# Patient Record
Sex: FEMALE | Race: WHITE | Employment: OTHER | URBAN - METROPOLITAN AREA
[De-identification: names, ages, dates, MRNs, and addresses within clinical notes are randomized per-mention and may not be internally consistent; named-entity substitution may affect disease eponyms.]

---

## 2017-05-01 ENCOUNTER — HOSPITAL ENCOUNTER (OUTPATIENT)
Dept: ULTRASOUND IMAGING | Facility: HOSPITAL | Age: 82
Discharge: HOME | End: 2017-05-01
Attending: INTERNAL MEDICINE
Payer: MEDICARE

## 2017-05-01 DIAGNOSIS — I73.9 PVD (PERIPHERAL VASCULAR DISEASE) (HCC): ICD-10-CM

## 2017-05-01 PROCEDURE — 93923 UPR/LXTR ART STDY 3+ LVLS: CPT

## 2017-05-21 ENCOUNTER — HOSPITAL ENCOUNTER (EMERGENCY)
Facility: HOSPITAL | Age: 82
Discharge: HOME | End: 2017-05-21
Attending: EMERGENCY MEDICINE
Payer: MEDICARE

## 2017-05-21 ENCOUNTER — APPOINTMENT (OUTPATIENT)
Dept: GENERAL RADIOLOGY | Facility: HOSPITAL | Age: 82
End: 2017-05-21
Attending: EMERGENCY MEDICINE
Payer: MEDICARE

## 2017-05-21 VITALS
RESPIRATION RATE: 13 BRPM | SYSTOLIC BLOOD PRESSURE: 165 MMHG | OXYGEN SATURATION: 95 % | DIASTOLIC BLOOD PRESSURE: 87 MMHG | TEMPERATURE: 97 F | HEART RATE: 55 BPM | BODY MASS INDEX: 17.63 KG/M2 | WEIGHT: 84 LBS | HEIGHT: 58 IN

## 2017-05-21 DIAGNOSIS — S76.011A MUSCLE STRAIN OF GLUTEAL REGION, RIGHT, INITIAL ENCOUNTER: Primary | ICD-10-CM

## 2017-05-21 PROCEDURE — 99283 EMERGENCY DEPT VISIT LOW MDM: CPT

## 2017-05-21 PROCEDURE — 73502 X-RAY EXAM HIP UNI 2-3 VIEWS: CPT | Performed by: EMERGENCY MEDICINE

## 2017-05-21 PROCEDURE — 96372 THER/PROPH/DIAG INJ SC/IM: CPT

## 2017-05-21 RX ORDER — ATORVASTATIN CALCIUM 40 MG/1
40 TABLET, FILM COATED ORAL NIGHTLY
COMMUNITY

## 2017-05-21 RX ORDER — MORPHINE SULFATE 2 MG/ML
2 INJECTION, SOLUTION INTRAMUSCULAR; INTRAVENOUS ONCE
Status: COMPLETED | OUTPATIENT
Start: 2017-05-21 | End: 2017-05-21

## 2017-05-21 NOTE — ED INITIAL ASSESSMENT (HPI)
Pt fell 6 months ago while in [place], work up was completed at that time. Pt presents to ED today with increasing pain to right hip. Denies any other falls or injury since fall in [place]. Able to move RLE but rates pain 9/10.

## 2017-06-14 ENCOUNTER — PRIOR ORIGINAL RECORDS (OUTPATIENT)
Dept: OTHER | Age: 82
End: 2017-06-14

## 2017-07-12 ENCOUNTER — PRIOR ORIGINAL RECORDS (OUTPATIENT)
Dept: OTHER | Age: 82
End: 2017-07-12

## 2017-07-13 ENCOUNTER — PRIOR ORIGINAL RECORDS (OUTPATIENT)
Dept: OTHER | Age: 82
End: 2017-07-13

## 2017-07-17 ENCOUNTER — PRIOR ORIGINAL RECORDS (OUTPATIENT)
Dept: OTHER | Age: 82
End: 2017-07-17

## 2017-07-22 ENCOUNTER — APPOINTMENT (OUTPATIENT)
Dept: GENERAL RADIOLOGY | Facility: HOSPITAL | Age: 82
DRG: 563 | End: 2017-07-22
Payer: MEDICARE

## 2017-07-22 ENCOUNTER — PRIOR ORIGINAL RECORDS (OUTPATIENT)
Dept: OTHER | Age: 82
End: 2017-07-22

## 2017-07-22 ENCOUNTER — HOSPITAL ENCOUNTER (INPATIENT)
Facility: HOSPITAL | Age: 82
LOS: 4 days | Discharge: NURSING, REHAB OR LONG-TERM CARE | DRG: 563 | End: 2017-07-26
Admitting: INTERNAL MEDICINE
Payer: MEDICARE

## 2017-07-22 ENCOUNTER — APPOINTMENT (OUTPATIENT)
Dept: GENERAL RADIOLOGY | Facility: HOSPITAL | Age: 82
DRG: 563 | End: 2017-07-22
Attending: EMERGENCY MEDICINE
Payer: MEDICARE

## 2017-07-22 DIAGNOSIS — S51.811A SKIN TEAR OF RIGHT FOREARM WITHOUT COMPLICATION, INITIAL ENCOUNTER: ICD-10-CM

## 2017-07-22 DIAGNOSIS — S52.001A CLOSED FRACTURE OF PROXIMAL END OF RIGHT ULNA, UNSPECIFIED FRACTURE MORPHOLOGY, INITIAL ENCOUNTER: Primary | ICD-10-CM

## 2017-07-22 LAB
ANION GAP SERPL CALC-SCNC: 8 MMOL/L (ref 0–18)
BASOPHILS # BLD: 0.1 K/UL (ref 0–0.2)
BASOPHILS NFR BLD: 1 %
BUN SERPL-MCNC: 21 MG/DL (ref 8–20)
BUN/CREAT SERPL: 41.2 (ref 10–20)
CALCIUM SERPL-MCNC: 8.9 MG/DL (ref 8.5–10.5)
CHLORIDE SERPL-SCNC: 109 MMOL/L (ref 95–110)
CO2 SERPL-SCNC: 24 MMOL/L (ref 22–32)
CREAT SERPL-MCNC: 0.51 MG/DL (ref 0.5–1.5)
EOSINOPHIL # BLD: 0.1 K/UL (ref 0–0.7)
EOSINOPHIL NFR BLD: 1 %
ERYTHROCYTE [DISTWIDTH] IN BLOOD BY AUTOMATED COUNT: 14.2 % (ref 11–15)
GLUCOSE SERPL-MCNC: 121 MG/DL (ref 70–99)
HCT VFR BLD AUTO: 34.4 % (ref 35–48)
HGB BLD-MCNC: 11.5 G/DL (ref 12–16)
LYMPHOCYTES # BLD: 1.9 K/UL (ref 1–4)
LYMPHOCYTES NFR BLD: 13 %
MCH RBC QN AUTO: 34.9 PG (ref 27–32)
MCHC RBC AUTO-ENTMCNC: 33.5 G/DL (ref 32–37)
MCV RBC AUTO: 104.1 FL (ref 80–100)
MONOCYTES # BLD: 1.3 K/UL (ref 0–1)
MONOCYTES NFR BLD: 9 %
NEUTROPHILS # BLD AUTO: 11 K/UL (ref 1.8–7.7)
NEUTROPHILS NFR BLD: 76 %
OSMOLALITY UR CALC.SUM OF ELEC: 296 MOSM/KG (ref 275–295)
PLATELET # BLD AUTO: 232 K/UL (ref 140–400)
PMV BLD AUTO: 8.8 FL (ref 7.4–10.3)
POTASSIUM SERPL-SCNC: 3.8 MMOL/L (ref 3.3–5.1)
RBC # BLD AUTO: 3.3 M/UL (ref 3.7–5.4)
SODIUM SERPL-SCNC: 141 MMOL/L (ref 136–144)
WBC # BLD AUTO: 14.5 K/UL (ref 4–11)

## 2017-07-22 PROCEDURE — 73090 X-RAY EXAM OF FOREARM: CPT

## 2017-07-22 PROCEDURE — 85025 COMPLETE CBC W/AUTO DIFF WBC: CPT | Performed by: EMERGENCY MEDICINE

## 2017-07-22 PROCEDURE — 73030 X-RAY EXAM OF SHOULDER: CPT | Performed by: EMERGENCY MEDICINE

## 2017-07-22 PROCEDURE — 99285 EMERGENCY DEPT VISIT HI MDM: CPT

## 2017-07-22 PROCEDURE — 80048 BASIC METABOLIC PNL TOTAL CA: CPT | Performed by: EMERGENCY MEDICINE

## 2017-07-22 PROCEDURE — 36415 COLL VENOUS BLD VENIPUNCTURE: CPT

## 2017-07-22 RX ORDER — RUFINAMIDE 40 MG/ML
1 SUSPENSION ORAL DAILY
Status: DISCONTINUED | OUTPATIENT
Start: 2017-07-23 | End: 2017-07-26

## 2017-07-22 RX ORDER — HYDROCODONE BITARTRATE AND ACETAMINOPHEN 5; 325 MG/1; MG/1
1 TABLET ORAL EVERY 6 HOURS PRN
Status: DISCONTINUED | OUTPATIENT
Start: 2017-07-22 | End: 2017-07-26

## 2017-07-22 RX ORDER — LISINOPRIL 5 MG/1
5 TABLET ORAL DAILY
Status: ON HOLD | COMMUNITY
End: 2018-07-08

## 2017-07-22 RX ORDER — LISINOPRIL 5 MG/1
5 TABLET ORAL DAILY
Status: DISCONTINUED | OUTPATIENT
Start: 2017-07-23 | End: 2017-07-26

## 2017-07-22 RX ORDER — CLOPIDOGREL BISULFATE 75 MG/1
75 TABLET ORAL DAILY
COMMUNITY
End: 2017-07-28

## 2017-07-22 RX ORDER — ATORVASTATIN CALCIUM 40 MG/1
40 TABLET, FILM COATED ORAL NIGHTLY
Status: DISCONTINUED | OUTPATIENT
Start: 2017-07-23 | End: 2017-07-26

## 2017-07-22 RX ORDER — GLUCOSAMINE/CHONDROITIN/C/MANG 500-400 MG
1 CAPSULE ORAL DAILY
Status: DISCONTINUED | OUTPATIENT
Start: 2017-07-23 | End: 2017-07-22 | Stop reason: RX

## 2017-07-23 ENCOUNTER — APPOINTMENT (OUTPATIENT)
Dept: CT IMAGING | Facility: HOSPITAL | Age: 82
DRG: 563 | End: 2017-07-23
Attending: ORTHOPAEDIC SURGERY
Payer: MEDICARE

## 2017-07-23 ENCOUNTER — APPOINTMENT (OUTPATIENT)
Dept: GENERAL RADIOLOGY | Facility: HOSPITAL | Age: 82
DRG: 563 | End: 2017-07-23
Attending: ORTHOPAEDIC SURGERY
Payer: MEDICARE

## 2017-07-23 LAB
APTT PPP: 31.9 SECONDS (ref 23.2–35.3)
BILIRUB UR QL: NEGATIVE
COLOR UR: YELLOW
GLUCOSE UR-MCNC: NEGATIVE MG/DL
HGB UR QL STRIP.AUTO: NEGATIVE
INR BLD: 1.1 (ref 0.9–1.2)
MRSA DNA SPEC QL NAA+PROBE: POSITIVE
NITRITE UR QL STRIP.AUTO: NEGATIVE
PH UR: 5 [PH] (ref 5–8)
PROT UR-MCNC: NEGATIVE MG/DL
PROTHROMBIN TIME: 14 SECONDS (ref 11.8–14.5)
RBC #/AREA URNS AUTO: 5 /HPF
SP GR UR STRIP: 1.02 (ref 1–1.03)
UROBILINOGEN UR STRIP-ACNC: <2
VIT C UR-MCNC: 40 MG/DL
WBC #/AREA URNS AUTO: 2 /HPF

## 2017-07-23 PROCEDURE — 81001 URINALYSIS AUTO W/SCOPE: CPT | Performed by: INTERNAL MEDICINE

## 2017-07-23 PROCEDURE — 73200 CT UPPER EXTREMITY W/O DYE: CPT | Performed by: ORTHOPAEDIC SURGERY

## 2017-07-23 PROCEDURE — 73080 X-RAY EXAM OF ELBOW: CPT | Performed by: ORTHOPAEDIC SURGERY

## 2017-07-23 PROCEDURE — 87641 MR-STAPH DNA AMP PROBE: CPT | Performed by: INTERNAL MEDICINE

## 2017-07-23 PROCEDURE — 76376 3D RENDER W/INTRP POSTPROCES: CPT | Performed by: ORTHOPAEDIC SURGERY

## 2017-07-23 PROCEDURE — 85730 THROMBOPLASTIN TIME PARTIAL: CPT | Performed by: INTERNAL MEDICINE

## 2017-07-23 PROCEDURE — 93005 ELECTROCARDIOGRAM TRACING: CPT

## 2017-07-23 PROCEDURE — 93010 ELECTROCARDIOGRAM REPORT: CPT | Performed by: INTERNAL MEDICINE

## 2017-07-23 PROCEDURE — 85610 PROTHROMBIN TIME: CPT | Performed by: INTERNAL MEDICINE

## 2017-07-23 RX ORDER — HYDROMORPHONE HYDROCHLORIDE 1 MG/ML
INJECTION, SOLUTION INTRAMUSCULAR; INTRAVENOUS; SUBCUTANEOUS
Status: COMPLETED
Start: 2017-07-23 | End: 2017-07-23

## 2017-07-23 RX ORDER — NALOXONE HYDROCHLORIDE 0.4 MG/ML
0.4 INJECTION, SOLUTION INTRAMUSCULAR; INTRAVENOUS; SUBCUTANEOUS ONCE AS NEEDED
Status: DISCONTINUED | OUTPATIENT
Start: 2017-07-23 | End: 2017-07-26

## 2017-07-23 RX ORDER — HYDROMORPHONE HYDROCHLORIDE 1 MG/ML
0.2 INJECTION, SOLUTION INTRAMUSCULAR; INTRAVENOUS; SUBCUTANEOUS ONCE
Status: COMPLETED | OUTPATIENT
Start: 2017-07-23 | End: 2017-07-23

## 2017-07-23 NOTE — ED PROVIDER NOTES
Patient Seen in: [facility] Emergency Department    History   Patient presents with:  Fall (musculoskeletal, neurologic)    Stated Complaint: right arm pain after falling off of step no loc    HPI    Patient is a 43-year-old female who tripped and Chondroitin Maximum Strength 500 mg-400 mg capsule       Family History   Problem Relation Age of Onset   • Cancer Father    • Heart Disease Mother        Smoking status: Never Smoker                                                              Alcohol use Distal circulation sensorimotor functions intact. Lymphadenopathy: No cervical adenopathy. Neurological: Alert and oriented to person, place, and time. Normal reflexes. No cranial nerve deficit.  No motor os sensory defecits noted Coordination normal.

## 2017-07-23 NOTE — PROGRESS NOTES
MULTIVITAMIN LIQD  is Non-Formulary Medication &  Auto-Substituted to Centrum chewable tablet Per P[employer]T PROTOCOL

## 2017-07-23 NOTE — PLAN OF CARE
Impaired Functional Mobility    • Achieve highest/safest level of mobility/gait Not Progressing        Patient anxious/confused at times which causes safety concerns with her mobility, can be impulsive. Sling frequently reapplied to RUE.      PAIN - ADULT

## 2017-07-23 NOTE — PROGRESS NOTES
[facility]    Progress Note    Kodi Billy Patient Status:  Inpatient    3/12/1919 MRN [id number]   Location [facility] 4W/SW/SE Attending Radha Mayer MD   Hosp Day # 1 PCP Chanelle Swan MD       Subjective:   Sachi Nelson Fuc Results:     Recent Labs   Lab  07/22/17 2259   RBC  3.30*   HGB  11.5*   HCT  34.4*   MCV  104.1*   MCH  34.9*   MCHC  33.5   RDW  14.2   WBC  14.5*   PLT  232       Recent Labs   Lab  07/22/17 2259   GLU  121*   BUN  21*   CREATSERUM  0.51   GFRA degenerative changes right shoulder. Degenerative changes acromioclavicular joint with type III acromial morphology which may reflect impingement. 2. Multiple small joint loose bodies. 3. No acute fracture or destructive process.  No dislocation/subluxation

## 2017-07-23 NOTE — ED INITIAL ASSESSMENT (HPI)
Pt reports mechanical fall from standing onto right side, has pain/tenderness to right forearm/elbow. Denies any head injury or LOC. Dressing applied by family to lac on location, bleeding controlleda t this time.

## 2017-07-23 NOTE — PROGRESS NOTES
Patient removed post mold and multiple steri strips. New steri strips applied to laceration, post mold re-applied and re-inforced. Dr. Osman He notified.

## 2017-07-23 NOTE — CONSULTS
[facility]    PATIENT'S NAME: Santino Wynne   ATTENDING PHYSICIAN: Yann Felder MD   CONSULTING PHYSICIAN: Haleigh Nails MD   PATIENT ACCOUNT#:   [id number]    LOCATION:  [address] [place] [address] #:   [id number]       DATE OF B through the dermis with no active bleeding. There was irrigation and debridement and the skin tear was steri-stripped. The patient was placed in a posterior mold splint. She not only takes Plavix on a regular basis, but omega-3 and omega-6 fish oil.   Sh Plavix and omega-3 for history of coronary artery disease with history of a myocardial infarction in the past and unstable angina.     RECOMMENDATIONS:  Review of the x-rays demonstrate not only comminuted fracture, as reported, but a subluxation of the yessy

## 2017-07-23 NOTE — PROGRESS NOTES
Pharmacy Note: Dietary Supplement Discontinuation Per Policy    Glucosamine-Chondroitin CAPS 1 tablethas been discontinued on Bonnie EVELYN Gibson per policy. This supplement may be restarted upon discharge using the medication reconciliation process.     Thank stephy

## 2017-07-23 NOTE — H&P
[facility]    PATIENT'S NAME: Joshua Arianna   ATTENDING PHYSICIAN: Yann Bolivar MD   PATIENT ACCOUNT#:   [id number]    LOCATION:  [address] [place], [place] [address]  #:   [id number]       YOB: 1919  ADMISSION DATE:       07/22/2 tibia-fibula fracture in the 1950s, as well as surgical cataract extraction and left eyelid correction in 2015, I believe.       HABITS:  The patient does not smoke and never has smoked cigarettes or any tobacco.  She rarely drinks alcohol, but has an occas rhonchi. There is no dullness to percussion. HEART:  A regular rate and rhythm with a normal S1 and S2. No S3 or S4 is appreciated. There is a grade 3/6 holosystolic murmur at the right and left upper sternal borders.   ABDOMEN:  Soft with positive acti morphology, which required subluxation of the joint and closed reduction performed by Dr. Carmen Jiménez with re-application of the posterior mold splint.   A CT of the arm will be obtained and surgical fixation of the fracture was discussed with patient by Dr. Rosa Putnam

## 2017-07-24 ENCOUNTER — PRIOR ORIGINAL RECORDS (OUTPATIENT)
Dept: OTHER | Age: 82
End: 2017-07-24

## 2017-07-24 ENCOUNTER — APPOINTMENT (OUTPATIENT)
Dept: CV DIAGNOSTICS | Facility: HOSPITAL | Age: 82
DRG: 563 | End: 2017-07-24
Attending: INTERNAL MEDICINE
Payer: MEDICARE

## 2017-07-24 LAB
ALBUMIN SERPL BCP-MCNC: 4 G/DL (ref 3.5–4.8)
ANION GAP SERPL CALC-SCNC: 11 MMOL/L (ref 0–18)
BASOPHILS # BLD: 0.1 K/UL (ref 0–0.2)
BASOPHILS NFR BLD: 1 %
BUN SERPL-MCNC: 16 MG/DL (ref 8–20)
BUN/CREAT SERPL: 27.6 (ref 10–20)
CALCIUM SERPL-MCNC: 9 MG/DL (ref 8.5–10.5)
CHLORIDE SERPL-SCNC: 107 MMOL/L (ref 95–110)
CO2 SERPL-SCNC: 20 MMOL/L (ref 22–32)
CREAT SERPL-MCNC: 0.58 MG/DL (ref 0.5–1.5)
EOSINOPHIL # BLD: 0 K/UL (ref 0–0.7)
EOSINOPHIL NFR BLD: 0 %
ERYTHROCYTE [DISTWIDTH] IN BLOOD BY AUTOMATED COUNT: 13.8 % (ref 11–15)
GLUCOSE SERPL-MCNC: 132 MG/DL (ref 70–99)
HCT VFR BLD AUTO: 34.2 % (ref 35–48)
HGB BLD-MCNC: 11.5 G/DL (ref 12–16)
LYMPHOCYTES # BLD: 1.7 K/UL (ref 1–4)
LYMPHOCYTES NFR BLD: 12 %
MCH RBC QN AUTO: 35 PG (ref 27–32)
MCHC RBC AUTO-ENTMCNC: 33.8 G/DL (ref 32–37)
MCV RBC AUTO: 103.6 FL (ref 80–100)
MONOCYTES # BLD: 2.6 K/UL (ref 0–1)
MONOCYTES NFR BLD: 17 %
NEUTROPHILS # BLD AUTO: 10.5 K/UL (ref 1.8–7.7)
NEUTROPHILS NFR BLD: 71 %
OSMOLALITY UR CALC.SUM OF ELEC: 289 MOSM/KG (ref 275–295)
PHOSPHATE SERPL-MCNC: 3 MG/DL (ref 2.4–4.7)
PLATELET # BLD AUTO: 277 K/UL (ref 140–400)
PMV BLD AUTO: 8.3 FL (ref 7.4–10.3)
POTASSIUM SERPL-SCNC: 3.8 MMOL/L (ref 3.3–5.1)
RBC # BLD AUTO: 3.3 M/UL (ref 3.7–5.4)
SODIUM SERPL-SCNC: 138 MMOL/L (ref 136–144)
WBC # BLD AUTO: 14.8 K/UL (ref 4–11)

## 2017-07-24 PROCEDURE — 93307 TTE W/O DOPPLER COMPLETE: CPT | Performed by: INTERNAL MEDICINE

## 2017-07-24 PROCEDURE — 80069 RENAL FUNCTION PANEL: CPT | Performed by: INTERNAL MEDICINE

## 2017-07-24 PROCEDURE — 85025 COMPLETE CBC W/AUTO DIFF WBC: CPT | Performed by: INTERNAL MEDICINE

## 2017-07-24 PROCEDURE — 85060 BLOOD SMEAR INTERPRETATION: CPT | Performed by: INTERNAL MEDICINE

## 2017-07-24 RX ORDER — HEPARIN SODIUM 5000 [USP'U]/ML
5000 INJECTION, SOLUTION INTRAVENOUS; SUBCUTANEOUS EVERY 12 HOURS SCHEDULED
Status: DISCONTINUED | OUTPATIENT
Start: 2017-07-24 | End: 2017-07-26

## 2017-07-24 RX ORDER — DIPHENHYDRAMINE HCL 25 MG
25 CAPSULE ORAL NIGHTLY PRN
Status: DISCONTINUED | OUTPATIENT
Start: 2017-07-24 | End: 2017-07-26

## 2017-07-24 RX ORDER — ESCITALOPRAM OXALATE 10 MG/1
5 TABLET ORAL EVERY MORNING
Status: DISCONTINUED | OUTPATIENT
Start: 2017-07-24 | End: 2017-07-26

## 2017-07-24 NOTE — PROGRESS NOTES
[facility]    Progress Note    Umer Royalty Patient Status:  Inpatient    3/12/1919 MRN [id number]   Location [facility] 4W/SW/SE Attending Kristie Arango MD   Hosp Day # 2 PCP Rip Andrade MD     Date of Admission:   tablet 1 tablet Oral Daily       HISTORY:  Past Medical History:  Past Medical History:   Diagnosis Date   • Arthritis    • Back pain    • Bursitis of elbow    • Cancer [facility]    • Fx 1997    per [employer]:  \"FX leslie 1997\"   • Hearing loss     per [employer] tissue swelling. No radiopaque foreign body.  3. Persistent superior/proximal/dorsal displacement of the olecranon fragment which remains rotated         Xr Forearm (2 Views), Right (cpt=73090)    Result Date: 7/23/2017  CONCLUSION:    BONES: Diffuse demine PAC s -Left axis -anterior fascicular block.  -Nonspecific ST depression -Nondiagnostic.  ABNORMAL When compared with ECG of 11/19/2014 10:35:45 Marked T wave changes are no longer seen Electronically signed on 07/23/2017 at 16:26 by TEN Davis

## 2017-07-24 NOTE — HISTORICAL OFFICE NOTE
Michael Petar  : 1919  ACCOUNT:  [id number]  [phone]  PCP: Dr. Julio Campos     TODAY'S DATE: 2017  DICTATED BY:  Arik Decker M.D. ]    CHIEF COMPLAINT: [Followup of .  CAD, established, Followup of Hyperlipidemia, mixed, Followup of Hyp parameters reviewed and discussed. HEAD/FACE: no trauma and normocephalic. EYES: conjunctivae not injected and no xanthelasma. NECK: jugular venous pressure not elevated. RESP: respirations with normal rate and rhythm, clear to auscultation.  GI: no masses, TABLET AT BEDTIME.                     12/08/14 Calcium 500 + D       500-125M  1 tab daily                              12/08/14 Multivitamins                   1 cap daily                              12/08/14 Norco                 5-325MG   as directed

## 2017-07-24 NOTE — DIETARY NOTE
ADULT NUTRITION INITIAL ASSESSMENT    Pt is at moderate nutrition risk. Pt does not meet malnutrition criteria. RECOMMENDATIONS TO MD: RD to order and manage oral nutritional supplement.       NUTRITION DIAGNOSIS/PROBLEM:  Inadequate oral intake relat hrs:   Weight   07/24/17 0422 38.6 kg (85 lb 3.2 oz)   07/22/17 2323 40.1 kg (88 lb 6.4 oz)   07/22/17 2003 40.4 kg (89 lb)   Wt Readings from Last 10 Encounters:  07/24/17 : 38.6 kg (85 lb 3.2 oz)  05/21/17 : 38.1 kg (84 lb)  07/03/14 : 40.8 kg (90 lb)  1 Anthropometric Measurement:      Monitor: wt  - Nutrition Goals:      gradual wt gain as able, PO and supplement greater than 75% of needs and promote healing      DIETITIAN FOLLOW UP: RD to follow up within 7 days    [address]

## 2017-07-24 NOTE — PROGRESS NOTES
[facility] dosing service    Initial Pharmacokinetic Consult for Vancomycin Dosing     Liss Joyner is a 80year old female admitted on 7/22who is being treated for cellulitis.   Pharmacy has been asked to dose Vancomycin by Dr. Prachi Hernandez    She is aller hours  based upon, 41kg weight, renal function, and pharmacokinetics. 2.  Pharmacy ordered Vancomycin trough level(s) prior to 3rd dose. Goal trough level 10-15 ug/mL.     3.  Pharmacy will need BUN/Scr daily while on Vancomycin to assess renal functio

## 2017-07-24 NOTE — DISCHARGE PLANNING
7/24CM-MD orders received in regards to discharge planning. The Patient was resting, this Writer spoke with the Patient's caregiver/[facility] [place] ([phone]) at bedside. The Patient resides alone in [place]  in a ranch home with on step to enter.   Prior to

## 2017-07-24 NOTE — CONSULTS
[facility]    PATIENT'S NAME: Wilton Bearden   ATTENDING PHYSICIAN: Yann Jarvis MD   CONSULTING PHYSICIAN: Yolanda Sabillon.  Radha Lopez MD   PATIENT ACCOUNT#:   [id number]    LOCATION:  [address] [place] [address] #:   [id number]       DATE OF BIRTH: REVIEW OF SYSTEMS:  Otherwise negative. PHYSICAL EXAMINATION:    GENERAL:  Well-developed, well-nourished female in no acute distress. She is alert and oriented x3.     VITAL SIGNS:  Blood pressure 121/60, respirations 20 and unlabored, pulse 100 on her history of 2 proximal stents and the need to come off Plavix. She does appear to be maximally treated and compensated for her known underlying cardiac disease at this time and is asymptomatic.       Follow up on echocardiogram.  Unless the LV functi

## 2017-07-24 NOTE — CONSULTS
Cardiology (Consult dictated)    Assessment:  1. Status post mechanical fall with complex fracture of proximal right ulna. Surgical correction being considered. 2.  Coronary artery disease.   Status post stenting of the proximal LAD and proximal ramus

## 2017-07-24 NOTE — PROGRESS NOTES
[facility]    Progress Note    Marv Finney Patient Status:  Inpatient    3/12/1919 MRN [id number]   Location [facility] 4W/SW/SE Attending Roxanna Hoyt MD   Hosp Day # 2 PCP Angely Watkins MD       Subjective:   Chelsea Marcial Fuc 8.9  9.0   ALB   --   4.0   NA  141  138   K  3.8  3.8   CL  109  107   CO2  24  20*       No results for input(s): LIP, HETAL in the last 72 hours. No results for input(s): TROP, CK in the last 72 hours.     Recent Labs   Lab  07/23/17   0552   INR  1.1 Xr Shoulder, Complete (min 2 Views), Right (cpt=73030)    Result Date: 7/23/2017  CONCLUSION:  1. Chronic degenerative changes right shoulder. Degenerative changes acromioclavicular joint with type III acromial morphology which may reflect impingement.

## 2017-07-24 NOTE — PLAN OF CARE
NEUROLOGICAL - ADULT    • Achieves stable or improved neurological status Not Progressing    • Achieves maximal functionality and self care Not Progressing        PT REMAINS RESTLESS, ALERT BUT DISORIENTED TO TIME AND PLACE, NEEDS FREQUENT REORIENTATION.  P SPECIAL MATRESS.

## 2017-07-25 LAB
PA ADP PRP-ACNC: 313
VANCOMYCIN TROUGH SERPL-MCNC: 4.1 MCG/ML (ref 10–20)

## 2017-07-25 PROCEDURE — 85576 BLOOD PLATELET AGGREGATION: CPT | Performed by: ORTHOPAEDIC SURGERY

## 2017-07-25 PROCEDURE — 80202 ASSAY OF VANCOMYCIN: CPT | Performed by: ORTHOPAEDIC SURGERY

## 2017-07-25 PROCEDURE — 99211 OFF/OP EST MAY X REQ PHY/QHP: CPT

## 2017-07-25 RX ORDER — SODIUM CHLORIDE 9 MG/ML
INJECTION, SOLUTION INTRAVENOUS
Status: COMPLETED
Start: 2017-07-25 | End: 2017-07-25

## 2017-07-25 RX ORDER — DIPHENHYDRAMINE HCL 25 MG
25 CAPSULE ORAL NIGHTLY PRN
Qty: 30 CAPSULE | Refills: 0 | Status: ON HOLD | OUTPATIENT
Start: 2017-07-25 | End: 2018-07-04

## 2017-07-25 RX ORDER — BISACODYL 10 MG
10 SUPPOSITORY, RECTAL RECTAL
Status: DISCONTINUED | OUTPATIENT
Start: 2017-07-25 | End: 2017-07-26

## 2017-07-25 RX ORDER — ESCITALOPRAM OXALATE 5 MG/1
5 TABLET ORAL EVERY MORNING
Qty: 30 TABLET | Refills: 0 | Status: SHIPPED | OUTPATIENT
Start: 2017-07-25 | End: 2019-09-03

## 2017-07-25 RX ORDER — CEFADROXIL 500 MG/1
500 CAPSULE ORAL 2 TIMES DAILY
Qty: 20 CAPSULE | Refills: 0 | Status: SHIPPED | OUTPATIENT
Start: 2017-07-25 | End: 2017-07-30

## 2017-07-25 RX ORDER — ACETAMINOPHEN 325 MG/1
650 TABLET ORAL EVERY 6 HOURS PRN
Status: DISCONTINUED | OUTPATIENT
Start: 2017-07-25 | End: 2017-07-26

## 2017-07-25 RX ORDER — 0.9 % SODIUM CHLORIDE 0.9 %
VIAL (ML) INJECTION
Status: COMPLETED
Start: 2017-07-25 | End: 2017-07-25

## 2017-07-25 NOTE — CONSULTS
Hand Surgery Consultation      [facility]    Report of Consultation    Marc Merino Patient Status:  Inpatient    3/12/1919 MRN [id number]   Location [facility] 4W/SW/SE Attending Tatum Nagel MD   Hosp Day # 3 RAMEZ GUERIN (Porcine) 5000 UNIT/ML injection 5,000 Units 5,000 Units Subcutaneous 2 times per day   diphenhydrAMINE (BENADRYL) cap/tab 25 mg 25 mg Oral Nightly PRN   escitalopram (LEXAPRO) tablet 5 mg 5 mg Oral QAM   Naloxone HCl (NARCAN) 0.4 MG/ML injection 0.4 mg 0. forearm wound consistent with skin tear extending for about 12 cm longitudinally over the dorsal forearm. No signs of infection. Active elbow flexion is to 110 degrees; unable to extend the elbow against resistance. Neurovascularly intact distally.   I a patient.     Isabel Lauro  7/25/2017

## 2017-07-25 NOTE — CM/SW NOTE
Patient is enrolled under  (fracture). [facility] brochure, letter and CTL's phone # [email or web address]. [facility] [phone]

## 2017-07-25 NOTE — WOUND PROGRESS NOTE
Wound Care Services  Received nursing request for specialty bed, the pt is 80years old with a right ulnar fracture, the pt. is asleep in bed with her sling on, family at the bedside.  Spoke with the pt's nurse and the pt. may be discharged today and come b

## 2017-07-25 NOTE — PLAN OF CARE
DISCHARGE PLANNING    • Discharge to home or other facility with appropriate resources Progressing    LASHONDA [facility]      Impaired Functional Mobility    • Achieve highest/safest level of mobility/gait Progressing

## 2017-07-25 NOTE — PLAN OF CARE
DISCHARGE PLANNING    • Discharge to home or other facility with appropriate resources Progressing        Impaired Functional Mobility    • Achieve highest/safest level of mobility/gait Progressing        NEUROLOGICAL - ADULT    • Achieves stable or improv

## 2017-07-25 NOTE — PROGRESS NOTES
[facility]    Progress Note    Dajuan Overall Patient Status:  Inpatient    3/12/1919 MRN [id number]   Location [facility] 4W/SW/SE Attending Sara Mc MD   Hosp Day # 3 PCP Sandee Liao MD     Date of Admission:   HYDROcodone-acetaminophen (NORCO) 5-325 MG per tab 1 tablet 1 tablet Oral Q6H PRN   lisinopril (PRINIVIL,ZESTRIL) tab 5 mg 5 mg Oral Daily   metoprolol Tartrate (LOPRESSOR) tab 25 mg 25 mg Oral BID   Multivitamin Chewtab (ADULT) (CENTRUM) 1 tablet 1 tabl body. 3. Persistent superior/proximal/dorsal displacement of the olecranon fragment which remains rotated         Ct Elbow Right (vcm=77973)    Result Date: 7/23/2017  CONCLUSION:  1.  Comminuted fracture of the proximal ulna with fracture lines extending i transcription software was utilized to produce this note. The note was proofread for content only. Typographical errors may remain.       Jose Del Valle MD  7/25/2017

## 2017-07-25 NOTE — PROGRESS NOTES
[facility]  Progress Note    Betsy Theodore Patient Status:  Inpatient    3/12/1919 MRN [id number]   Location [facility] 4W/SW/SE Attending Jun Dorado MD   Hosp Day # 3 PCP Re Mckinley MD     Assessment:    1.   Status post  rhythm, S1, S2 normal, no murmur  Lungs: Clear without wheezes, rales, rhonchi. Normal excursions and effort. Abdomen: Soft, non-tender. Skin: Warm and dry.      Labs:       Lab Results  Component Value Date   INR 1.1 07/23/2017           No results fou

## 2017-07-25 NOTE — PROGRESS NOTES
[facility]    Progress Note    Dajuan Overall Patient Status:  Inpatient    3/12/1919 MRN [id number]   Location [facility] 4W/SW/SE Attending Sara Mc MD   Hosp Day # 3 PCP Sandee Liao MD       Subjective:   Lyndon Fatima BUN  21*  16   CREATSERUM  0.51  0.58   GFRAA  >60  >60   GFRNAA  >60  >60   CA  8.9  9.0   ALB   --   4.0   NA  141  138   K  3.8  3.8   CL  109  107   CO2  24  20*       No results for input(s): LIP, HETAL in the last 72 hours.     No results for input(s) MD  7/25/2017

## 2017-07-26 VITALS
WEIGHT: 84.69 LBS | OXYGEN SATURATION: 92 % | TEMPERATURE: 99 F | DIASTOLIC BLOOD PRESSURE: 50 MMHG | RESPIRATION RATE: 18 BRPM | SYSTOLIC BLOOD PRESSURE: 112 MMHG | HEIGHT: 59 IN | HEART RATE: 72 BPM | BODY MASS INDEX: 17.07 KG/M2

## 2017-07-26 PROCEDURE — 97760 ORTHOTIC MGMT&TRAING 1ST ENC: CPT

## 2017-07-26 RX ORDER — ACETAMINOPHEN 325 MG/1
650 TABLET ORAL EVERY 6 HOURS PRN
Qty: 1 TABLET | Refills: 0 | Status: SHIPPED | OUTPATIENT
Start: 2017-07-26

## 2017-07-26 NOTE — PROGRESS NOTES
[facility]    Progress Note    Kemarandreas Delgado Patient Status:  Inpatient    3/12/1919 MRN [id number]   Location [facility] 4W/SW/SE Attending Chris Higuera MD   Hosp Day # 4 PCP Shade Monsivais MD       Subjective:   Dean Fatima CA  9.0   ALB  4.0   NA  138   K  3.8   CL  107   CO2  20*       No results for input(s): LIP, HETAL in the last 72 hours. No results for input(s): TROP, CK in the last 72 hours. No results for input(s): PT, INR in the last 72 hours.     No results fo

## 2017-07-26 NOTE — OCCUPATIONAL THERAPY NOTE
OT ADULT SPLINT EVALUATION - INPATIENT    Evaluation Date: 8/2   Type of Evaluation: Initial  Re-Evaluation Reason:   Presenting Problem:  (Closed fracture of proximal end of right ulna) motivated  Rehab Barriers: history of falls    PLAN  Patient to d/c to rehab today    [facility]  #[id number]

## 2017-07-26 NOTE — PROGRESS NOTES
[facility] Progress Note      Kiana Simran Patient Status:  Inpatient    3/12/1919 MRN [id number]   Location [facility] 4W/SW/SE Attending Irina Zhu MD   Hosp Day # 4 PCP Shell Collado MD 1,250 mg Intravenous Q24H   • Heparin Sodium (Porcine)  5,000 Units Subcutaneous 2 times per day   • escitalopram  5 mg Oral QAM   • mupirocin  1 Application Each Nare BID   • atorvastatin  40 mg Oral Nightly   • lisinopril  5 mg Oral Daily   • metoprolol

## 2017-07-26 NOTE — PROGRESS NOTES
[facility]    Progress Note    Laura Gonzalez Patient Status:  Inpatient    3/12/1919 MRN [id number]   Location [facility] 4W/SW/SE Attending Paco Beltre MD   Hosp Day # 4 PCP Corbin Santana MD       Subjective:   Juan Mon Fuc hours. No results for input(s): TROP, CK in the last 72 hours. No results for input(s): PT, INR in the last 72 hours. No results for input(s): PGLU in the last 72 hours.     Scheduled Meds:   • vancomycin  1,250 mg Intravenous Q24H   • Heparin Sodi

## 2017-07-26 NOTE — DISCHARGE PLANNING
The pt. Is scheduled to discharge to [facility] today 7/26 at 330p, via ambulance as she is a max assist.  The pt's POA is aware of the above.     Report [phone]    [facility] [phone]  LAI spoke with the pt's POA regarding the Medicare

## 2017-07-26 NOTE — PLAN OF CARE
DISCHARGE PLANNING    • Discharge to home or other facility with appropriate resources Adequate for Discharge    Discharged to [facility] by ambulance. Agnieszka Lopez POMARKO aware and agreeable.  Pt to [place] couple of weeks\" for surgery when ruddy

## 2017-07-27 ENCOUNTER — SNF VISIT (OUTPATIENT)
Dept: INTERNAL MEDICINE CLINIC | Facility: SKILLED NURSING FACILITY | Age: 82
End: 2017-07-27

## 2017-07-27 VITALS
BODY MASS INDEX: 18 KG/M2 | HEART RATE: 88 BPM | TEMPERATURE: 99 F | SYSTOLIC BLOOD PRESSURE: 126 MMHG | DIASTOLIC BLOOD PRESSURE: 60 MMHG | WEIGHT: 88 LBS | RESPIRATION RATE: 17 BRPM

## 2017-07-27 DIAGNOSIS — N39.0 URINARY TRACT INFECTION, SITE UNSPECIFIED: ICD-10-CM

## 2017-07-27 DIAGNOSIS — S52.001D CLOSED FRACTURE OF PROXIMAL END OF RIGHT ULNA WITH ROUTINE HEALING, UNSPECIFIED FRACTURE MORPHOLOGY, SUBSEQUENT ENCOUNTER: Primary | ICD-10-CM

## 2017-07-27 NOTE — PROGRESS NOTES
Jason Balling  : 3/12/1919  Age 80year old  female patient is admitted to [place] [address] for strengthening and rehabilitation on 2017.      Admitted to [address] [place] [address]: 2017- 2017    Chief complaint: Fall, comminuted fracture of right proximal uln [Penicillins]       Itching    CODE STATUS:  DNR    ADVANCED CARE PLANNING TEAM: Will require family care conference soon: does live at home, reports a caregiver during the week.        CURRENT MEDICATIONS: Reviewed and updated    Current Outpatient Prescri shortness of breath, wheezing or cough   CARDIOVASCULAR:denies chest pain, no palpitations   GI: denies nausea, vomiting, constipation, diarrhea; no rectal bleeding; no heartburn  :no dysuria, urgency or frequency; no vaginal discharge; no urinary incont Omega 3 and plavix on hold  -Sling  -Xeroform dressing with ABD pain, kerlix, splint and sling in place  -Tylenol 650 mg PO PRN  -CTM      2. Skin tear of right forearm without complication  -Wound care (Xeroform with pad and kerlix) splint and sling  - Con

## 2017-08-08 ENCOUNTER — SNF VISIT (OUTPATIENT)
Dept: INTERNAL MEDICINE CLINIC | Facility: SKILLED NURSING FACILITY | Age: 82
End: 2017-08-08

## 2017-08-08 VITALS
WEIGHT: 85.38 LBS | BODY MASS INDEX: 19 KG/M2 | TEMPERATURE: 97 F | HEART RATE: 87 BPM | RESPIRATION RATE: 18 BRPM | DIASTOLIC BLOOD PRESSURE: 60 MMHG | SYSTOLIC BLOOD PRESSURE: 128 MMHG

## 2017-08-08 DIAGNOSIS — S52.001D CLOSED FRACTURE OF PROXIMAL END OF RIGHT ULNA WITH ROUTINE HEALING, UNSPECIFIED FRACTURE MORPHOLOGY, SUBSEQUENT ENCOUNTER: Primary | ICD-10-CM

## 2017-08-08 NOTE — PROGRESS NOTES
Lina Dominguez, 3/12/1919, 80year old, female at [place] [address] for strengthening and rehabilitation on 7/26/2017     Chief Complaint: Fall, comminuted fracture of right proximal ulna with skin tear      Subjective: Rehab follow up     HPI:  14-year-old S1, S2, RRR, no click, no murmur  Abdomen: Normoactive bowel sounds ×4 quadrants, soft, nondistended, nontender  Extremities: No cyanosis clubbing or edema, radial and dorsalis pedis pulses 2+ bilaterally      Medications reviewed: Yes    Diagnostics revie

## 2017-08-11 ENCOUNTER — SNF VISIT (OUTPATIENT)
Dept: INTERNAL MEDICINE CLINIC | Facility: SKILLED NURSING FACILITY | Age: 82
End: 2017-08-11

## 2017-08-11 VITALS
SYSTOLIC BLOOD PRESSURE: 145 MMHG | WEIGHT: 86 LBS | DIASTOLIC BLOOD PRESSURE: 70 MMHG | HEART RATE: 73 BPM | OXYGEN SATURATION: 96 % | TEMPERATURE: 99 F | BODY MASS INDEX: 19 KG/M2

## 2017-08-11 DIAGNOSIS — S52.091D OTHER CLOSED FRACTURE OF PROXIMAL END OF RIGHT ULNA WITH ROUTINE HEALING, SUBSEQUENT ENCOUNTER: Primary | ICD-10-CM

## 2017-08-11 DIAGNOSIS — R53.1 WEAKNESS GENERALIZED: ICD-10-CM

## 2017-08-11 DIAGNOSIS — N31.9 FUNCTIONAL DISORDER OF BLADDER: ICD-10-CM

## 2017-08-11 DIAGNOSIS — H90.8 MIXED HEARING LOSS, UNILATERAL: ICD-10-CM

## 2017-08-11 NOTE — PROGRESS NOTES
Manuel Comer  : 3/12/1919  Age 80year old  female patient is admitted to [facility]  For strengthening and rehab    Admitted to [facility] on: 17 to 17    Chief complaint: Fall, comminuted fracture of right proximal uln every morning. Disp: 30 tablet Rfl: 0   DiphenhydrAMINE HCl 25 MG Oral Cap Take 1 capsule (25 mg total) by mouth nightly as needed for Sleep.  Disp: 30 capsule Rfl: 0   mupirocin 2% Nasal Ointment 0.9 g (1 Application total) by Each Nare route 2 (two) times DRAINS:  none  SKIN: no rashes, no suspicious lesions  WOUND: right arm in cast  EYES: PERRLA, EOMI, sclera anicteric, conjunctiva normal; there is no nystagmus, no drainage from eyes  HENT: normocephalic; normal nose, no nasal drainage, mucous membranes p tab QD  -MVI daily   8.  Discharge plan  - Family looking into temporarily  AL with a caregiver , possibly [facility]   -wants to get home fall proof and safer before going home with caregiver or maybe if patient likes the place she goes too, maybe she will

## 2017-08-14 ENCOUNTER — SNF VISIT (OUTPATIENT)
Dept: INTERNAL MEDICINE CLINIC | Facility: SKILLED NURSING FACILITY | Age: 82
End: 2017-08-14

## 2017-08-14 VITALS
HEART RATE: 86 BPM | RESPIRATION RATE: 20 BRPM | DIASTOLIC BLOOD PRESSURE: 72 MMHG | TEMPERATURE: 97 F | BODY MASS INDEX: 19 KG/M2 | WEIGHT: 86 LBS | SYSTOLIC BLOOD PRESSURE: 130 MMHG | OXYGEN SATURATION: 97 %

## 2017-08-14 DIAGNOSIS — S52.091D OTHER CLOSED FRACTURE OF PROXIMAL END OF RIGHT ULNA WITH ROUTINE HEALING, SUBSEQUENT ENCOUNTER: ICD-10-CM

## 2017-08-14 DIAGNOSIS — H90.8 MIXED HEARING LOSS, UNILATERAL: Primary | ICD-10-CM

## 2017-08-14 NOTE — PROGRESS NOTES
Quinn Messi  : 3/12/1919  Age 80year old  female patient is admitted to [facility]  For strengthening and rehab    Admitted to [facility] on: 17 to 17    Chief complaint: Fall, comminuted fracture of right proximal uln ACCEPTANCE    CURRENT MEDICATIONS -reviewed and updated    Current Outpatient Prescriptions:  acetaminophen 325 MG Oral Tab Take 2 tablets (650 mg total) by mouth every 6 (six) hours as needed.  Disp: 1 tablet Rfl: 0   escitalopram 5 MG Oral Tab Take 1 tabl complaint  PSYCHE: no symptoms of depression or anxiety  HEMATOLOGY:denies hx anemia  ENDOCRINE: denies excessive thirst or urination; denies unexpected wt gain or wt loss  ALLERGY/IMM.: denies food or seasonal allergies        PHYSICAL EXAM:  [facility] until 7/30/2017  -Monitor for s/s infection , no s/s at this time     3. History of ASHD  -Dr. Tran Randolph, her cardiologist, will evaluate patient preoperatively. -Echo from 7/25 EF 50% no significant valvular abnormalities      4.  Hypertension  -VS Q shift

## 2017-08-16 ENCOUNTER — SNF VISIT (OUTPATIENT)
Dept: INTERNAL MEDICINE CLINIC | Facility: SKILLED NURSING FACILITY | Age: 82
End: 2017-08-16

## 2017-08-16 VITALS
RESPIRATION RATE: 20 BRPM | DIASTOLIC BLOOD PRESSURE: 56 MMHG | TEMPERATURE: 97 F | HEART RATE: 91 BPM | WEIGHT: 86 LBS | OXYGEN SATURATION: 97 % | BODY MASS INDEX: 19 KG/M2 | SYSTOLIC BLOOD PRESSURE: 110 MMHG

## 2017-08-16 DIAGNOSIS — S52.091D OTHER CLOSED FRACTURE OF PROXIMAL END OF RIGHT ULNA WITH ROUTINE HEALING, SUBSEQUENT ENCOUNTER: Primary | ICD-10-CM

## 2017-08-16 DIAGNOSIS — H90.8 MIXED HEARING LOSS, UNILATERAL: ICD-10-CM

## 2017-08-16 DIAGNOSIS — N31.9 FUNCTIONAL DISORDER OF BLADDER: ICD-10-CM

## 2017-08-16 NOTE — PROGRESS NOTES
Sun Gonzalez  : 3/12/1919  Age 80year old  female patient is admitted to [facility] 17 for For strengthening and rehab     Admitted to [facility] AND [facility] on:17 to 17     Chief complaint: Fall, comminuted fracture of right proximal CARE PLANNING TEAM:Care plan done 8/14/17, plan is for AL with caregiver possibly at [facility] or [facility], AWAITING ACCEPTANCE     CURRENT MEDICATIONS-reviewed and updated     Current Outpatient Prescriptions:  acetaminophen 325 MG Oral Tab Take 2 ta incontinence; no hematuria  MUSCULOSKELETAL: right arm with discomfort if moved, reports more pain today  NEURO:no sensory or motor complaint  PSYCHE: no symptoms of depression or anxiety  HEMATOLOGY:denies hx anemia  ENDOCRINE: denies excessive thirst or right forearm without complication  -Wound care (Xeroform with pad and kerlix) splint and sling  - Completed Duricef 500 mg BID until 7/30/2017  -Monitor for s/s infection , no s/s at this time   3.  Hypertension- stable  -VS Q shift  -Continue Metoprolol t

## 2017-08-17 ENCOUNTER — SNF VISIT (OUTPATIENT)
Dept: INTERNAL MEDICINE CLINIC | Facility: SKILLED NURSING FACILITY | Age: 82
End: 2017-08-17

## 2017-08-17 VITALS
DIASTOLIC BLOOD PRESSURE: 56 MMHG | OXYGEN SATURATION: 97 % | BODY MASS INDEX: 19 KG/M2 | SYSTOLIC BLOOD PRESSURE: 126 MMHG | HEART RATE: 77 BPM | TEMPERATURE: 97 F | WEIGHT: 83 LBS | RESPIRATION RATE: 20 BRPM

## 2017-08-17 DIAGNOSIS — S52.091D OTHER CLOSED FRACTURE OF PROXIMAL END OF RIGHT ULNA WITH ROUTINE HEALING, SUBSEQUENT ENCOUNTER: Primary | ICD-10-CM

## 2017-08-17 DIAGNOSIS — N31.9 FUNCTIONAL DISORDER OF BLADDER: ICD-10-CM

## 2017-08-17 DIAGNOSIS — R53.1 GENERALIZED WEAKNESS: ICD-10-CM

## 2017-08-17 DIAGNOSIS — R52 PAIN: ICD-10-CM

## 2017-08-17 NOTE — PROGRESS NOTES
Kate Carmona  : 3/12/1919  Age 80year old  female patient is admitted to [facility] for strengthening and rehab    Admitted to [facility] AND [facility] on:17 to 17      Chief complaint:Fall, comminuted fracture of right proximal ulna placed. Will cont to follow as needed, waiting for acceptance to AL which should be very soon .     ALLERGIES:    Pcn [Penicillins]       Itching    CODE STATUS:  DNR    ADVANCED CARE PLANNING TEAM: Care plan done 8/14/17, plan is for AL with caregiver po palpitations   GI: denies nausea, vomiting, constipation, diarrhea; no rectal bleeding; no heartburn  :no dysuria, urgency or frequency; no vaginal discharge; no urinary incontinence; no hematuria  MUSCULOSKELETAL: right arm with discomfort if moved, rep since not a surgical candidate   -Sling  -Xeroform dressing with ABD pain, kerlix, splint and sling in place  -Tylenol 650 mg PO PRN  -Dr Smith Null following, saw Ortho 8/16/17 and reports healing well, no cast needed  -CTM   2.Skin tear of right forearm wi

## 2017-08-22 ENCOUNTER — SNF VISIT (OUTPATIENT)
Dept: INTERNAL MEDICINE CLINIC | Facility: SKILLED NURSING FACILITY | Age: 82
End: 2017-08-22

## 2017-08-22 VITALS
SYSTOLIC BLOOD PRESSURE: 126 MMHG | WEIGHT: 83 LBS | OXYGEN SATURATION: 96 % | BODY MASS INDEX: 19 KG/M2 | RESPIRATION RATE: 20 BRPM | DIASTOLIC BLOOD PRESSURE: 56 MMHG | HEART RATE: 83 BPM | TEMPERATURE: 97 F

## 2017-08-22 DIAGNOSIS — Z02.9 DISCHARGE PLANNING ISSUES: ICD-10-CM

## 2017-08-22 DIAGNOSIS — H90.8 MIXED HEARING LOSS, UNILATERAL: Primary | ICD-10-CM

## 2017-08-22 DIAGNOSIS — S52.091D OTHER CLOSED FRACTURE OF PROXIMAL END OF RIGHT ULNA WITH ROUTINE HEALING, SUBSEQUENT ENCOUNTER: ICD-10-CM

## 2017-08-22 NOTE — PROGRESS NOTES
Deonte Beck  : 3/12/1919  Age 80year old  female patient is admitted to [facility] 17 for strengthening and rehab    Admitted to [facility] AND [facility] on:17 to 17      Chief complaint: Fall, comminuted fracture of right proximal uln need hospital bed upon discharge due to difficulty with fractured NWB arm to get in and out of a regular bed, needs HOB elevated to reduce aspiration and hospital bed due to very fragile skin .  Will need hh/pt/ot for home since moving into an apt, will dis denies nasal congestion, sinus pain or sore throat;  RESPIRATORY: denies shortness of breath, wheezing or cough   CARDIOVASCULAR:denies chest pain, no palpitations   GI: denies nausea, vomiting, constipation, diarrhea; no rectal bleeding; no heartburn  : morphology with large skin tear  -Not a surgical candidate   -Plavix restarted since not a surgical candidate   -Sling  -Xeroform dressing with ABD pain, kerlix, splint and sling in place  -Tylenol 650 mg PO PRN  -Dr Smith Null following, kimberlyn Carty 8/16/17 a

## 2017-08-26 NOTE — DISCHARGE SUMMARY
[facility]    PATIENT'S NAME: Saji Sy TONYA   ATTENDING PHYSICIAN: Bharath Santoyo.  Sharon Tate MD   PATIENT ACCOUNT#:   [id number]    LOCATION:  [address] [place] [address] #:   [id number]       YOB: 1919  ADMISSION DATE:       07/2 with Dr. Angelique Hartley. Her Plavix was discontinued. High-resolution CT scan of the elbow was ordered. It was felt that the patient should not undergo any surgical procedure for at least 5 to 7 days.   The patient was seen in consultation by Dr. Corey Carrillo as a caterina

## 2017-08-28 ENCOUNTER — SNF VISIT (OUTPATIENT)
Dept: INTERNAL MEDICINE CLINIC | Facility: SKILLED NURSING FACILITY | Age: 82
End: 2017-08-28

## 2017-08-28 VITALS
SYSTOLIC BLOOD PRESSURE: 141 MMHG | BODY MASS INDEX: 19 KG/M2 | DIASTOLIC BLOOD PRESSURE: 63 MMHG | RESPIRATION RATE: 20 BRPM | OXYGEN SATURATION: 97 % | TEMPERATURE: 97 F | HEART RATE: 74 BPM | WEIGHT: 86.81 LBS

## 2017-08-28 DIAGNOSIS — H90.8 MIXED HEARING LOSS, UNILATERAL: Primary | ICD-10-CM

## 2017-08-28 DIAGNOSIS — S52.091D OTHER CLOSED FRACTURE OF PROXIMAL END OF RIGHT ULNA WITH ROUTINE HEALING, SUBSEQUENT ENCOUNTER: ICD-10-CM

## 2017-08-28 NOTE — PROGRESS NOTES
Solitario Altamirano  : 3/12/1919  Age 80year old  female patient is admitted to [facility] 17 for strengthening and rehab    Admitted to [facility] AND [facility] on: 17 to 17      Chief complaint:  Fall, comminuted fracture of right proximal u due to generalized weakness, unsteadiness on feet due to fractured arm and risk of falls.  Will need hospital bed upon discharge due to difficulty with fractured NWB arm to get in and out of a regular bed, needs HOB elevated to reduce aspiration and hospita arm with hard splint , elevated on pillows   EYES:no visual complaints or deficits  HENT: denies nasal congestion, sinus pain or sore throat;  RESPIRATORY: denies shortness of breath, wheezing or cough   CARDIOVASCULAR:denies chest pain, no palpitations POA     SEE PLAN BELOW  Plan:   1. Closed fracture of proximal end of right ulna, unspecified fracture morphology with large skin tear  -Not a surgical candidate   -Plavix restarted since not a surgical candidate   -Sling  -Xeroform dressing with ABD pain,

## 2017-09-05 ENCOUNTER — SNF VISIT (OUTPATIENT)
Dept: INTERNAL MEDICINE CLINIC | Facility: SKILLED NURSING FACILITY | Age: 82
End: 2017-09-05

## 2017-09-05 VITALS
SYSTOLIC BLOOD PRESSURE: 110 MMHG | BODY MASS INDEX: 20 KG/M2 | DIASTOLIC BLOOD PRESSURE: 63 MMHG | HEART RATE: 60 BPM | OXYGEN SATURATION: 96 % | TEMPERATURE: 97 F | RESPIRATION RATE: 20 BRPM | WEIGHT: 88.38 LBS

## 2017-09-05 DIAGNOSIS — S51.011S SKIN TEAR OF RIGHT ELBOW WITHOUT COMPLICATION, SEQUELA: ICD-10-CM

## 2017-09-05 DIAGNOSIS — H90.8 MIXED HEARING LOSS, UNILATERAL: Primary | ICD-10-CM

## 2017-09-05 DIAGNOSIS — R41.0 CONFUSION: ICD-10-CM

## 2017-09-05 NOTE — PROGRESS NOTES
Wendy Fraga, 3/12/1919, 80year old, female is being discharged from [place] [address] 9/6/17 going to  [facility] with a 24 hr caregiver      DISCHARGE SUMMARY    Date of Admission [facility] AND [facility] :7/22/17 to 7/26/17 300mg po tid was started by Dr. Julia Booker over the weekend for elbow skin tear, foam dressing is now in placed and changed daily with Bactraban. Patient improved today but yesterday had episodes of confusion and she typically is very alert and oriented x 3.  Prerna Pastrana with florastor 250 mg po bid  EYES: PERRLA, EOMI, sclera anicteric, conjunctiva normal; there is no nystagmus, no drainage from eyes  HENT: normocephalic; normal nose, no nasal drainage, mucous membranes pink, moist, pharynx no exudate, no visible cerumen. Hypercholesterolemia  -Continue Atorvastatin 40 mg QHS  5. History of depression  -mood fair   -Continue Lexapro 5 mg QD  6. Osteopenia/osteoporosis  -Continue Glucosamine tab QD  -MVI daily   7.  Discharge plan  - Family decided to stay at [facility]

## 2017-09-22 LAB
BUN: 21 MG/DL
CALCIUM: 8.9 MG/DL
CHLORIDE: 109 MEQ/L
CREATININE, SERUM: 0.51 MG/DL
GLUCOSE: 121 MG/DL
HEMATOCRIT: 34.2 %
HEMOGLOBIN: 11.5 G/DL
MCH: 35 PG
MCHC: 33.8 G/DL
MCV: 103.6 FL
PLATELETS: 277 K/UL
POTASSIUM, SERUM: 3.8 MEQ/L
RED BLOOD COUNT: 3.3 X 10-6/U
SODIUM: 141 MEQ/L
WHITE BLOOD COUNT: 14.8 X 10-3/U

## 2017-09-25 ENCOUNTER — PRIOR ORIGINAL RECORDS (OUTPATIENT)
Dept: OTHER | Age: 82
End: 2017-09-25

## 2017-10-31 NOTE — ED NOTES
Patient states she has had moments during the last few weeks when she has visual hallucinations. States Kimberlyn Sherman is in another world for a few minutes, and then comes back. \" Denies any hallucinations or pain at this time.

## 2017-10-31 NOTE — ED INITIAL ASSESSMENT (HPI)
Patient states she has don hallucinating on and off for the last few weeks. States Reagan Sim is another world. \" No complaints of hallucinations or pain at this time./ Patient is alert and oriented.

## 2017-11-01 NOTE — ED PROVIDER NOTES
Patient Seen in: [facility] Emergency Department    History   Patient presents with:  Altered Mental Status (neurologic)      HPI    Patient presents stating that she has been hallucinating intermittently for the past several days.   He states that Cardiovascular: Negative for chest pain and palpitations. Gastrointestinal: Negative for abdominal pain and vomiting. Genitourinary: Negative for dysuria and hematuria. Musculoskeletal: Negative for back pain and neck pain.    Skin: Negative for aleta All other components within normal limits   CBC W/ DIFFERENTIAL - Abnormal; Notable for the following:     WBC 16.3 (*)     RBC 3.55 (*)     HGB 11.9 (*)     .9 (*)     MCH 33.6 (*)     RDW 15.2 (*)     Neutrophil Absolute 12.3 (*)     Monocyte Abso and Skin tear of right forearm without complication, initial encounter on her problem list. to contribute to the complexity of this ED evaluation.     ED Course: Patient presents with sundowning symptoms in the past several days at her nursing care facility

## 2018-01-08 ENCOUNTER — PRIOR ORIGINAL RECORDS (OUTPATIENT)
Dept: OTHER | Age: 83
End: 2018-01-08

## 2018-04-16 ENCOUNTER — PRIOR ORIGINAL RECORDS (OUTPATIENT)
Dept: OTHER | Age: 83
End: 2018-04-16

## 2018-07-04 ENCOUNTER — HOSPITAL ENCOUNTER (INPATIENT)
Facility: HOSPITAL | Age: 83
LOS: 4 days | Discharge: HOME | DRG: 690 | End: 2018-07-08
Attending: EMERGENCY MEDICINE | Admitting: INTERNAL MEDICINE
Payer: MEDICARE

## 2018-07-04 ENCOUNTER — APPOINTMENT (OUTPATIENT)
Dept: GENERAL RADIOLOGY | Facility: HOSPITAL | Age: 83
DRG: 690 | End: 2018-07-04
Attending: EMERGENCY MEDICINE
Payer: MEDICARE

## 2018-07-04 ENCOUNTER — APPOINTMENT (OUTPATIENT)
Dept: CT IMAGING | Facility: HOSPITAL | Age: 83
DRG: 690 | End: 2018-07-04
Attending: EMERGENCY MEDICINE
Payer: MEDICARE

## 2018-07-04 DIAGNOSIS — E86.0 DEHYDRATION: ICD-10-CM

## 2018-07-04 DIAGNOSIS — N30.00 ACUTE CYSTITIS WITHOUT HEMATURIA: Primary | ICD-10-CM

## 2018-07-04 LAB
ALBUMIN SERPL BCP-MCNC: 3.9 G/DL (ref 3.5–4.8)
ALP SERPL-CCNC: 52 U/L (ref 32–100)
ALT SERPL-CCNC: 15 U/L (ref 14–54)
ANION GAP SERPL CALC-SCNC: 11 MMOL/L (ref 0–18)
AST SERPL-CCNC: 30 U/L (ref 15–41)
BASOPHILS # BLD: 0 K/UL (ref 0–0.2)
BASOPHILS NFR BLD: 0 %
BILIRUB DIRECT SERPL-MCNC: 0.3 MG/DL (ref 0–0.2)
BILIRUB SERPL-MCNC: 1.6 MG/DL (ref 0.3–1.2)
BILIRUB UR QL: NEGATIVE
BUN SERPL-MCNC: 17 MG/DL (ref 8–20)
BUN/CREAT SERPL: 27.4 (ref 10–20)
CALCIUM SERPL-MCNC: 9.2 MG/DL (ref 8.5–10.5)
CHLORIDE SERPL-SCNC: 107 MMOL/L (ref 95–110)
CO2 SERPL-SCNC: 23 MMOL/L (ref 22–32)
COLOR UR: YELLOW
CREAT SERPL-MCNC: 0.62 MG/DL (ref 0.5–1.5)
EOSINOPHIL # BLD: 0 K/UL (ref 0–0.7)
EOSINOPHIL NFR BLD: 0 %
ERYTHROCYTE [DISTWIDTH] IN BLOOD BY AUTOMATED COUNT: 13.7 % (ref 11–15)
GLUCOSE SERPL-MCNC: 104 MG/DL (ref 70–99)
GLUCOSE UR-MCNC: NEGATIVE MG/DL
HCT VFR BLD AUTO: 36.8 % (ref 35–48)
HGB BLD-MCNC: 12.4 G/DL (ref 12–16)
LACTATE SERPL-SCNC: 2.2 MMOL/L (ref 0.5–2.2)
LACTATE SERPL-SCNC: 2.2 MMOL/L (ref 0.5–2.2)
LACTATE SERPL-SCNC: 2.9 MMOL/L (ref 0.5–2.2)
LIPASE SERPL-CCNC: 27 U/L (ref 22–51)
LYMPHOCYTES # BLD: 0.4 K/UL (ref 1–4)
LYMPHOCYTES NFR BLD: 4 %
MCH RBC QN AUTO: 34.8 PG (ref 27–32)
MCHC RBC AUTO-ENTMCNC: 33.6 G/DL (ref 32–37)
MCV RBC AUTO: 103.7 FL (ref 80–100)
MONOCYTES # BLD: 0.1 K/UL (ref 0–1)
MONOCYTES NFR BLD: 1 %
NEUTROPHILS # BLD AUTO: 9.7 K/UL (ref 1.8–7.7)
NEUTROPHILS NFR BLD: 95 %
NITRITE UR QL STRIP.AUTO: POSITIVE
OSMOLALITY UR CALC.SUM OF ELEC: 294 MOSM/KG (ref 275–295)
PH UR: 5 [PH] (ref 5–8)
PLATELET # BLD AUTO: 194 K/UL (ref 140–400)
PMV BLD AUTO: 8.5 FL (ref 7.4–10.3)
POTASSIUM SERPL-SCNC: 3.5 MMOL/L (ref 3.3–5.1)
PROT SERPL-MCNC: 5.7 G/DL (ref 5.9–8.4)
PROT UR-MCNC: NEGATIVE MG/DL
RBC # BLD AUTO: 3.55 M/UL (ref 3.7–5.4)
RBC #/AREA URNS AUTO: 2 /HPF
SODIUM SERPL-SCNC: 141 MMOL/L (ref 136–144)
SP GR UR STRIP: 1.01 (ref 1–1.03)
TROPONIN I SERPL-MCNC: 0 NG/ML (ref ?–0.03)
UROBILINOGEN UR STRIP-ACNC: <2
VIT C UR-MCNC: NEGATIVE MG/DL
WBC # BLD AUTO: 10.2 K/UL (ref 4–11)
WBC #/AREA URNS AUTO: 8 /HPF

## 2018-07-04 PROCEDURE — 85025 COMPLETE CBC W/AUTO DIFF WBC: CPT | Performed by: EMERGENCY MEDICINE

## 2018-07-04 PROCEDURE — 87088 URINE BACTERIA CULTURE: CPT | Performed by: EMERGENCY MEDICINE

## 2018-07-04 PROCEDURE — 97530 THERAPEUTIC ACTIVITIES: CPT

## 2018-07-04 PROCEDURE — 74176 CT ABD & PELVIS W/O CONTRAST: CPT | Performed by: EMERGENCY MEDICINE

## 2018-07-04 PROCEDURE — 83605 ASSAY OF LACTIC ACID: CPT | Performed by: EMERGENCY MEDICINE

## 2018-07-04 PROCEDURE — 96365 THER/PROPH/DIAG IV INF INIT: CPT

## 2018-07-04 PROCEDURE — 97162 PT EVAL MOD COMPLEX 30 MIN: CPT

## 2018-07-04 PROCEDURE — 83690 ASSAY OF LIPASE: CPT | Performed by: EMERGENCY MEDICINE

## 2018-07-04 PROCEDURE — 84484 ASSAY OF TROPONIN QUANT: CPT | Performed by: EMERGENCY MEDICINE

## 2018-07-04 PROCEDURE — 87186 SC STD MICRODIL/AGAR DIL: CPT | Performed by: EMERGENCY MEDICINE

## 2018-07-04 PROCEDURE — 99285 EMERGENCY DEPT VISIT HI MDM: CPT

## 2018-07-04 PROCEDURE — 87086 URINE CULTURE/COLONY COUNT: CPT | Performed by: EMERGENCY MEDICINE

## 2018-07-04 PROCEDURE — 80076 HEPATIC FUNCTION PANEL: CPT | Performed by: EMERGENCY MEDICINE

## 2018-07-04 PROCEDURE — 80048 BASIC METABOLIC PNL TOTAL CA: CPT | Performed by: EMERGENCY MEDICINE

## 2018-07-04 PROCEDURE — 36415 COLL VENOUS BLD VENIPUNCTURE: CPT

## 2018-07-04 PROCEDURE — 97116 GAIT TRAINING THERAPY: CPT

## 2018-07-04 PROCEDURE — 93005 ELECTROCARDIOGRAM TRACING: CPT

## 2018-07-04 PROCEDURE — 96361 HYDRATE IV INFUSION ADD-ON: CPT

## 2018-07-04 PROCEDURE — 93010 ELECTROCARDIOGRAM REPORT: CPT | Performed by: INTERNAL MEDICINE

## 2018-07-04 PROCEDURE — 87040 BLOOD CULTURE FOR BACTERIA: CPT | Performed by: EMERGENCY MEDICINE

## 2018-07-04 PROCEDURE — 81001 URINALYSIS AUTO W/SCOPE: CPT | Performed by: EMERGENCY MEDICINE

## 2018-07-04 PROCEDURE — 71045 X-RAY EXAM CHEST 1 VIEW: CPT | Performed by: EMERGENCY MEDICINE

## 2018-07-04 RX ORDER — ENOXAPARIN SODIUM 100 MG/ML
30 INJECTION SUBCUTANEOUS DAILY
Status: DISCONTINUED | OUTPATIENT
Start: 2018-07-04 | End: 2018-07-08

## 2018-07-04 RX ORDER — GABAPENTIN 100 MG/1
100 CAPSULE ORAL NIGHTLY
COMMUNITY
End: 2019-09-03

## 2018-07-04 RX ORDER — CIPROFLOXACIN 250 MG/1
250 TABLET, FILM COATED ORAL 2 TIMES DAILY
Qty: 14 TABLET | Refills: 0 | Status: SHIPPED | OUTPATIENT
Start: 2018-07-04 | End: 2018-07-08

## 2018-07-04 RX ORDER — LIDOCAINE 50 MG/G
2 PATCH TOPICAL EVERY 12 HOURS PRN
COMMUNITY

## 2018-07-04 RX ORDER — CLOPIDOGREL BISULFATE 75 MG/1
75 TABLET ORAL DAILY
Status: DISCONTINUED | OUTPATIENT
Start: 2018-07-04 | End: 2018-07-08

## 2018-07-04 RX ORDER — GABAPENTIN 100 MG/1
100 CAPSULE ORAL NIGHTLY
Status: DISCONTINUED | OUTPATIENT
Start: 2018-07-04 | End: 2018-07-08

## 2018-07-04 RX ORDER — ATORVASTATIN CALCIUM 40 MG/1
40 TABLET, FILM COATED ORAL NIGHTLY
Status: DISCONTINUED | OUTPATIENT
Start: 2018-07-04 | End: 2018-07-08

## 2018-07-04 RX ORDER — SACCHAROMYCES BOULARDII 250 MG
250 CAPSULE ORAL 2 TIMES DAILY
Status: ON HOLD | COMMUNITY
End: 2018-08-05

## 2018-07-04 RX ORDER — LIDOCAINE 50 MG/G
2 PATCH TOPICAL DAILY PRN
Status: DISCONTINUED | OUTPATIENT
Start: 2018-07-04 | End: 2018-07-08

## 2018-07-04 RX ORDER — ESCITALOPRAM OXALATE 10 MG/1
5 TABLET ORAL NIGHTLY
Status: DISCONTINUED | OUTPATIENT
Start: 2018-07-04 | End: 2018-07-08

## 2018-07-04 RX ORDER — SODIUM CHLORIDE 9 MG/ML
INJECTION, SOLUTION INTRAVENOUS CONTINUOUS
Status: DISCONTINUED | OUTPATIENT
Start: 2018-07-04 | End: 2018-07-08

## 2018-07-04 RX ORDER — CLOPIDOGREL BISULFATE 75 MG/1
75 TABLET ORAL DAILY
COMMUNITY
End: 2019-09-16

## 2018-07-04 RX ORDER — ENOXAPARIN SODIUM 100 MG/ML
40 INJECTION SUBCUTANEOUS DAILY
Status: DISCONTINUED | OUTPATIENT
Start: 2018-07-04 | End: 2018-07-04

## 2018-07-04 RX ORDER — GUAIFENESIN/DEXTROMETHORPHAN 100-10MG/5
10 SYRUP ORAL EVERY 6 HOURS PRN
COMMUNITY

## 2018-07-04 RX ORDER — ACETAMINOPHEN 325 MG/1
650 TABLET ORAL EVERY 6 HOURS PRN
Status: DISCONTINUED | OUTPATIENT
Start: 2018-07-04 | End: 2018-07-08

## 2018-07-04 NOTE — PLAN OF CARE
Problem: Patient/Family Goals  Goal: Patient/Family Short Term Goal  Patient's Short Term Goal: clear infection and improve BP levels    Interventions:   - monitor BP   - continue IV fluids  - monitor electrolytes  - See additional Care Plan goals for spec strengthening/mobility  - Encourage toileting schedule    Outcome: Progressing  Bed alarm active/audible; non skid socks in place; up to chair with walker +1 person assist; frequent rounding; POA and caregiver at bedside; call light within reach.     Proble

## 2018-07-04 NOTE — ED INITIAL ASSESSMENT (HPI)
Pt brought from [facility] retirement assisted living for 2 episodes of clear emesis. Pt is fine afterwards. Denied fevers, diarrhea, constipation or CP. lbm 1hr ago.

## 2018-07-04 NOTE — PROGRESS NOTES
[facility] Note:  Renal Dose Adjustment for Enoxaparin (LOVENOX)    Mitchell Balderas has been prescribed Enoxaparin (LOVENOX) 40 mg subcutaneously every 24 hours. Estimated Creatinine Clearance: 23 mL/min (based on SCr rounded up to 0.85 mg/dL).     H

## 2018-07-04 NOTE — ED PROVIDER NOTES
Patient Seen in: [facility] AND [facility] Emergency Department    History   Patient presents with:  Nausea/Vomiting/Diarrhea (gastrointestinal)    Stated Complaint:     HPI    29-year-old female resident at [facility] with 2 episodes of vomiting to distress. Head: Normocephalic and atraumatic. Eyes: Conjunctivae are normal. Pupils are equal, round, and reactive to light. Neck: Normal range of motion. Neck supple. Cardiovascular: Normal rate, regular rhythm and intact and equal distal pulses. PLATELET    Narrative: The following orders were created for panel order CBC WITH DIFFERENTIAL WITH PLATELET.   Procedure                               Abnormality         Status                     ---------                               ----------- inferior pubic ramus fracture. L5-S1 posterior instrumented fusion/laminectomy change. Chronic vert body height loss. Report faxed at 4:39 AM JEANINE Turpin M.D.   This report has been electronically signed and verified by the Radiologist whose

## 2018-07-04 NOTE — PHYSICAL THERAPY NOTE
PHYSICAL THERAPY EVALUATION - INPATIENT     Room Number: 571/571-A  Evaluation Date: 7/4/2018  Type of Evaluation: Initial   Physician Order: PT Eval and Treat    Presenting Problem: UTI, vomiting  Reason for Therapy: Mobility Dysfunction and Discharge Pl pain, shortness of breath, abdominal pain, dysuria. Patient is incontinent of urine. She complains of back pain, but this is chronic and secondary to her arthritis.      In the ED, patient was afebrile, and mildly hypotensive with systolic blood pressures [place]: per caregiver and pt, pt is min A in her mobilities at home and uses a rollator when walking within the apartment and w/c in the community    SUBJECTIVE  I am tired    PHYSICAL THERAPY EXAMINATION     OBJECTIVE     Fall Risk: High fall risk chair;Needs met;Call light within reach;RN aware of session/findings; All patient questions and concerns addressed (caregiver present)    CURRENT GOALS    Goals to be met by: 7/11/18  Patient Goal Patient's self-stated goal is: is home   Goal #1 Patient is

## 2018-07-05 LAB
ANION GAP SERPL CALC-SCNC: 6 MMOL/L (ref 0–18)
BASOPHILS # BLD: 0.1 K/UL (ref 0–0.2)
BASOPHILS NFR BLD: 0 %
BUN SERPL-MCNC: 13 MG/DL (ref 8–20)
BUN/CREAT SERPL: 28.9 (ref 10–20)
CALCIUM SERPL-MCNC: 7.8 MG/DL (ref 8.5–10.5)
CHLORIDE SERPL-SCNC: 109 MMOL/L (ref 95–110)
CO2 SERPL-SCNC: 21 MMOL/L (ref 22–32)
CREAT SERPL-MCNC: 0.45 MG/DL (ref 0.5–1.5)
EOSINOPHIL # BLD: 0 K/UL (ref 0–0.7)
EOSINOPHIL NFR BLD: 0 %
ERYTHROCYTE [DISTWIDTH] IN BLOOD BY AUTOMATED COUNT: 13.5 % (ref 11–15)
GLUCOSE SERPL-MCNC: 119 MG/DL (ref 70–99)
HCT VFR BLD AUTO: 27.9 % (ref 35–48)
HGB BLD-MCNC: 9.3 G/DL (ref 12–16)
LYMPHOCYTES # BLD: 0.9 K/UL (ref 1–4)
LYMPHOCYTES NFR BLD: 7 %
MAGNESIUM SERPL-MCNC: 1.4 MG/DL (ref 1.8–2.5)
MCH RBC QN AUTO: 34.6 PG (ref 27–32)
MCHC RBC AUTO-ENTMCNC: 33.3 G/DL (ref 32–37)
MCV RBC AUTO: 103.8 FL (ref 80–100)
MONOCYTES # BLD: 1.8 K/UL (ref 0–1)
MONOCYTES NFR BLD: 14 %
NEUTROPHILS # BLD AUTO: 10.1 K/UL (ref 1.8–7.7)
NEUTROPHILS NFR BLD: 79 %
OSMOLALITY UR CALC.SUM OF ELEC: 283 MOSM/KG (ref 275–295)
PLATELET # BLD AUTO: 142 K/UL (ref 140–400)
PMV BLD AUTO: 8.7 FL (ref 7.4–10.3)
POTASSIUM SERPL-SCNC: 3.1 MMOL/L (ref 3.3–5.1)
RBC # BLD AUTO: 2.69 M/UL (ref 3.7–5.4)
SODIUM SERPL-SCNC: 136 MMOL/L (ref 136–144)
WBC # BLD AUTO: 12.8 K/UL (ref 4–11)

## 2018-07-05 PROCEDURE — 80048 BASIC METABOLIC PNL TOTAL CA: CPT | Performed by: INTERNAL MEDICINE

## 2018-07-05 PROCEDURE — 85025 COMPLETE CBC W/AUTO DIFF WBC: CPT | Performed by: INTERNAL MEDICINE

## 2018-07-05 PROCEDURE — 83735 ASSAY OF MAGNESIUM: CPT | Performed by: INTERNAL MEDICINE

## 2018-07-05 PROCEDURE — 97116 GAIT TRAINING THERAPY: CPT

## 2018-07-05 PROCEDURE — 97110 THERAPEUTIC EXERCISES: CPT

## 2018-07-05 PROCEDURE — 87507 IADNA-DNA/RNA PROBE TQ 12-25: CPT | Performed by: INTERNAL MEDICINE

## 2018-07-05 RX ORDER — GUAIFENESIN/DEXTROMETHORPHAN 100-10MG/5
100 SYRUP ORAL 4 TIMES DAILY PRN
Status: DISCONTINUED | OUTPATIENT
Start: 2018-07-05 | End: 2018-07-08

## 2018-07-05 RX ORDER — MAGNESIUM OXIDE 400 MG (241.3 MG MAGNESIUM) TABLET
800 TABLET ONCE
Status: COMPLETED | OUTPATIENT
Start: 2018-07-05 | End: 2018-07-05

## 2018-07-05 RX ORDER — POTASSIUM CHLORIDE 14.9 MG/ML
20 INJECTION INTRAVENOUS ONCE
Status: COMPLETED | OUTPATIENT
Start: 2018-07-05 | End: 2018-07-05

## 2018-07-05 NOTE — PLAN OF CARE
Problem: Patient Centered Care  Goal: Patient preferences are identified and integrated in the patient's plan of care  Interventions:  - What would you like us to know as we care for you?  I have a caregiver at home  - Provide timely, complete, and accurate appropriate     Outcome: Progressing  Caregiver and patient informed on plan of care.     Problem: RISK FOR INFECTION - ADULT  Goal: Absence of fever/infection during anticipated neutropenic period  INTERVENTIONS  - Monitor WBC  - Administer growth factors to return home with family once medically cleared.

## 2018-07-05 NOTE — PHYSICAL THERAPY NOTE
PHYSICAL THERAPY TREATMENT NOTE - INPATIENT     Room Number: 274/494-Y       Presenting Problem: UTI, vomiting    Problem List  Principal Problem:    Acute cystitis without hematuria  Active Problems:    Dehydration      ASSESSMENT   Patient is a 80 year o sheets and blankets)?: A Little   -   Sitting down on and standing up from a chair with arms (e.g., wheelchair, bedside commode, etc.): A Little   -   Moving from lying on back to sitting on the side of the bed?: A Little   How much help from another perso Goal #6    Goal #6  Current Status

## 2018-07-05 NOTE — PLAN OF CARE
Problem: Patient Centered Care  Goal: Patient preferences are identified and integrated in the patient's plan of care  Interventions:  - What would you like us to know as we care for you?  I have a caregiver at home  - Provide timely, complete, and accurate fever/infection during anticipated neutropenic period  INTERVENTIONS  - Monitor WBC  - Administer growth factors as ordered  - Implement neutropenic guidelines     Outcome: Progressing  VSS.  Mild fever this shift, tylenol administered and ice packs applied

## 2018-07-05 NOTE — PROGRESS NOTES
[facility]    Progress Note    Juliette Dixon Patient Status:  Inpatient    3/12/1919 MRN [id number]   Location [facility] 5SW/SE Attending Vera Gomez MD   Hosp Day # 1 PCP Kumar Nolen MD       Subjective:   Vivek Alexander --        Recent Labs   Lab  07/04/18   0305   LIP  27       Recent Labs   Lab  07/04/18   0305   TROP  0.00       No results for input(s): PT, INR in the last 168 hours. No results for input(s): PGLU in the last 168 hours.     Scheduled Meds:   • diego (cpt=71045)    Result Date: 7/4/2018  CONCLUSION:  1. No focal airspace disease or significant pleural effusion. 2. Prominent interstitial markings. 3. Sequelae of remote granulomatous disease. 4. Lesser incidental findings as above.      Dictated by (CST):

## 2018-07-06 LAB
ADENOVIRUS F 40/41 PCR: NEGATIVE
ANION GAP SERPL CALC-SCNC: 4 MMOL/L (ref 0–18)
ASTROVIRUS PCR: NEGATIVE
BASOPHILS # BLD: 0.1 K/UL (ref 0–0.2)
BASOPHILS NFR BLD: 1 %
BUN SERPL-MCNC: 5 MG/DL (ref 8–20)
BUN/CREAT SERPL: 11.9 (ref 10–20)
C CAYETANENSIS DNA SPEC QL NAA+PROBE: NEGATIVE
C. DIFFICILE TOXIN A/B PCR: POSITIVE
CALCIUM SERPL-MCNC: 7.8 MG/DL (ref 8.5–10.5)
CAMPY SP DNA.DIARRHEA STL QL NAA+PROBE: NEGATIVE
CHLORIDE SERPL-SCNC: 112 MMOL/L (ref 95–110)
CO2 SERPL-SCNC: 22 MMOL/L (ref 22–32)
CREAT SERPL-MCNC: 0.42 MG/DL (ref 0.5–1.5)
CRYPTOSP DNA SPEC QL NAA+PROBE: NEGATIVE
EAEC PAA PLAS AGGR+AATA ST NAA+NON-PRB: NEGATIVE
EC STX1+STX2 + H7 FLIC SPEC NAA+PROBE: NEGATIVE
ENTAMOEBA HISTOLYTICA PCR: NEGATIVE
EOSINOPHIL # BLD: 0.1 K/UL (ref 0–0.7)
EOSINOPHIL NFR BLD: 2 %
EPEC EAE GENE STL QL NAA+NON-PROBE: NEGATIVE
ERYTHROCYTE [DISTWIDTH] IN BLOOD BY AUTOMATED COUNT: 14 % (ref 11–15)
ETEC LTA+ST1A+ST1B TOX ST NAA+NON-PROBE: NEGATIVE
FOLATE SERPL-MCNC: 21.9 NG/ML
GIARDIA LAMBLIA PCR: NEGATIVE
GLUCOSE SERPL-MCNC: 94 MG/DL (ref 70–99)
HCT VFR BLD AUTO: 27.8 % (ref 35–48)
HEMOCCULT STL QL: NEGATIVE
HGB BLD-MCNC: 9.4 G/DL (ref 12–16)
IRON SATN MFR SERPL: 5 % (ref 15–50)
IRON SERPL-MCNC: 12 MCG/DL (ref 28–170)
LYMPHOCYTES # BLD: 1.5 K/UL (ref 1–4)
LYMPHOCYTES NFR BLD: 17 %
MAGNESIUM SERPL-MCNC: 1.6 MG/DL (ref 1.8–2.5)
MCH RBC QN AUTO: 35.2 PG (ref 27–32)
MCHC RBC AUTO-ENTMCNC: 33.9 G/DL (ref 32–37)
MCV RBC AUTO: 103.9 FL (ref 80–100)
MONOCYTES # BLD: 1.5 K/UL (ref 0–1)
MONOCYTES NFR BLD: 17 %
NEUTROPHILS # BLD AUTO: 5.5 K/UL (ref 1.8–7.7)
NEUTROPHILS NFR BLD: 63 %
NOROVIRUS GI/GII PCR: NEGATIVE
OSMOLALITY UR CALC.SUM OF ELEC: 283 MOSM/KG (ref 275–295)
P SHIGELLOIDES DNA STL QL NAA+PROBE: NEGATIVE
PLATELET # BLD AUTO: 133 K/UL (ref 140–400)
PMV BLD AUTO: 8.5 FL (ref 7.4–10.3)
POTASSIUM SERPL-SCNC: 3.6 MMOL/L (ref 3.3–5.1)
RBC # BLD AUTO: 2.67 M/UL (ref 3.7–5.4)
ROTAVIRUS A PCR: NEGATIVE
SALMONELLA DNA SPEC QL NAA+PROBE: NEGATIVE
SAPOVIRUS PCR: NEGATIVE
SHIGELLA SP+EIEC IPAH ST NAA+NON-PROBE: NEGATIVE
SODIUM SERPL-SCNC: 138 MMOL/L (ref 136–144)
TIBC SERPL-MCNC: 242 MCG/DL (ref 228–428)
TRANSFERRIN SERPL-MCNC: 183 MG/DL (ref 192–382)
TSH SERPL-ACNC: 1.82 UIU/ML (ref 0.45–5.33)
V CHOLERAE DNA SPEC QL NAA+PROBE: NEGATIVE
VIBRIO DNA SPEC NAA+PROBE: NEGATIVE
VIT B12 SERPL-MCNC: 260 PG/ML (ref 181–914)
WBC # BLD AUTO: 8.8 K/UL (ref 4–11)
YERSINIA DNA SPEC NAA+PROBE: NEGATIVE

## 2018-07-06 PROCEDURE — 82272 OCCULT BLD FECES 1-3 TESTS: CPT | Performed by: INTERNAL MEDICINE

## 2018-07-06 PROCEDURE — 83735 ASSAY OF MAGNESIUM: CPT | Performed by: INTERNAL MEDICINE

## 2018-07-06 PROCEDURE — 82607 VITAMIN B-12: CPT | Performed by: INTERNAL MEDICINE

## 2018-07-06 PROCEDURE — 84443 ASSAY THYROID STIM HORMONE: CPT | Performed by: INTERNAL MEDICINE

## 2018-07-06 PROCEDURE — 80048 BASIC METABOLIC PNL TOTAL CA: CPT | Performed by: INTERNAL MEDICINE

## 2018-07-06 PROCEDURE — 83540 ASSAY OF IRON: CPT | Performed by: INTERNAL MEDICINE

## 2018-07-06 PROCEDURE — 85025 COMPLETE CBC W/AUTO DIFF WBC: CPT | Performed by: INTERNAL MEDICINE

## 2018-07-06 PROCEDURE — 82746 ASSAY OF FOLIC ACID SERUM: CPT | Performed by: INTERNAL MEDICINE

## 2018-07-06 PROCEDURE — 84466 ASSAY OF TRANSFERRIN: CPT | Performed by: INTERNAL MEDICINE

## 2018-07-06 RX ORDER — DOXEPIN HYDROCHLORIDE 50 MG/1
1 CAPSULE ORAL DAILY
Status: DISCONTINUED | OUTPATIENT
Start: 2018-07-06 | End: 2018-07-08

## 2018-07-06 RX ORDER — MAGNESIUM SULFATE HEPTAHYDRATE 40 MG/ML
2 INJECTION, SOLUTION INTRAVENOUS ONCE
Status: COMPLETED | OUTPATIENT
Start: 2018-07-06 | End: 2018-07-06

## 2018-07-06 NOTE — PLAN OF CARE
Problem: Patient Centered Care  Goal: Patient preferences are identified and integrated in the patient's plan of care  Interventions:  - What would you like us to know as we care for you?  I have a caregiver at home  - Provide timely, complete, and accurate period  INTERVENTIONS  - Monitor WBC  - Administer growth factors as ordered  - Implement neutropenic guidelines     Outcome: Progressing      Problem: SAFETY ADULT - FALL  Goal: Free from fall injury  INTERVENTIONS:  - Assess pt frequently for physical ne

## 2018-07-06 NOTE — PROGRESS NOTES
[facility]    Progress Note    Deonte Beck Patient Status:  Inpatient    3/12/1919 MRN [id number]   Location [facility] 5SW/SE Attending Deisy Abel MD   Hosp Day # 2 PCP Soledad Cardona MD       Subjective:   Abisai Smith 10.2  12.8*  8.8   PLT  194  142  133*       Recent Labs   Lab  07/04/18   0305  07/05/18   0637  07/06/18   0547   GLU  104*  119*  94   BUN  17  13  5*   CREATSERUM  0.62  0.45*  0.42*   GFRAA  >60  >60  >60   GFRNAA  >60  >60  >60   CA  9.2  7.8*  7.8*

## 2018-07-06 NOTE — RESPIRATORY THERAPY NOTE
Complete PFT was ordered by Dr Herbert Cantor today. Pt is not stable to perform the PFT at this time according to the RN [facility]. Pt is also on isolation for C. DIFF. RN will be informing the DR.  PFT will done when patient is off the isolation and when patient is mo

## 2018-07-06 NOTE — PLAN OF CARE
Problem: Patient Centered Care  Goal: Patient preferences are identified and integrated in the patient's plan of care  Interventions:  - What would you like us to know as we care for you?  I have a caregiver at home  - Provide timely, complete, and accurate pain    Problem: RISK FOR INFECTION - ADULT  Goal: Absence of fever/infection during anticipated neutropenic period  INTERVENTIONS  - Monitor WBC  - Administer growth factors as ordered  - Implement neutropenic guidelines     Outcome: Progressing  Afebrile

## 2018-07-06 NOTE — PHYSICAL THERAPY NOTE
Chart reviewed    RN approve participation if family will agree to it as family in room . Per RN pt is positive for c -diff with loose stool. Therapy attempt this PM  , pt family declining therapy participation for pt .    \"She is just out of it ,

## 2018-07-06 NOTE — DIETARY NOTE
ADULT NUTRITION INITIAL ASSESSMENT    Pt is at moderate nutrition risk. Pt does not meet malnutrition criteria. RECOMMENDATIONS TO MD:  None at this time . RD added Oral Nutritional Supplements  (ONS) to maximize po.     NUTRITION DIAGNOSIS/PROBLEM: 0245 40.4 kg (89 lb)     GASTROINTESTINAL Status: nausea  (+) BM 7/4 Loose stool, (+) C-diff. FOOD/NUTRITION RELATED HISTORY:   Current Appetite: Fair  Intake: 50-80% documented.    Intake Meeting Needs: Marginal, added supplements   Food Allergies: No

## 2018-07-07 LAB
MAGNESIUM SERPL-MCNC: 2.1 MG/DL (ref 1.8–2.5)
POTASSIUM SERPL-SCNC: 3.6 MMOL/L (ref 3.3–5.1)
POTASSIUM SERPL-SCNC: 4.5 MMOL/L (ref 3.3–5.1)

## 2018-07-07 PROCEDURE — 84132 ASSAY OF SERUM POTASSIUM: CPT | Performed by: INTERNAL MEDICINE

## 2018-07-07 PROCEDURE — 83735 ASSAY OF MAGNESIUM: CPT | Performed by: INTERNAL MEDICINE

## 2018-07-07 PROCEDURE — 97116 GAIT TRAINING THERAPY: CPT

## 2018-07-07 RX ORDER — 0.9 % SODIUM CHLORIDE 0.9 %
3 VIAL (ML) INJECTION AS NEEDED
Status: DISCONTINUED | OUTPATIENT
Start: 2018-07-07 | End: 2018-07-08

## 2018-07-07 RX ORDER — POTASSIUM CHLORIDE 20 MEQ/1
40 TABLET, EXTENDED RELEASE ORAL EVERY 4 HOURS
Status: COMPLETED | OUTPATIENT
Start: 2018-07-07 | End: 2018-07-07

## 2018-07-07 NOTE — PROGRESS NOTES
[facility]    Progress Note    Erick Contreras Patient Status:  Inpatient    3/12/1919 MRN [id number]   Location [facility] 5SW/SE Attending Kristie Arango MD   Hosp Day # 3 PCP Rip Andrade MD       Subjective:   Eddie Tijerina

## 2018-07-07 NOTE — PHYSICAL THERAPY NOTE
PHYSICAL THERAPY TREATMENT NOTE - INPATIENT     Room Number: 169/491-S       Presenting Problem: UTI    Problem List  Principal Problem:    Acute cystitis without hematuria  Active Problems:    Dehydration      ASSESSMENT    Pt tolerated tx well, limited b back to sitting on the side of the bed?: A Little   How much help from another person does the patient currently need. ..   -   Moving to and from a bed to a chair (including a wheelchair)?: A Little   -   Need to walk in hospital room?: A [address]

## 2018-07-08 VITALS
WEIGHT: 88.88 LBS | HEIGHT: 58 IN | HEART RATE: 76 BPM | SYSTOLIC BLOOD PRESSURE: 147 MMHG | DIASTOLIC BLOOD PRESSURE: 71 MMHG | BODY MASS INDEX: 18.66 KG/M2 | TEMPERATURE: 98 F | OXYGEN SATURATION: 93 % | RESPIRATION RATE: 16 BRPM

## 2018-07-08 LAB
ANION GAP SERPL CALC-SCNC: 5 MMOL/L (ref 0–18)
BASOPHILS # BLD: 0.1 K/UL (ref 0–0.2)
BASOPHILS NFR BLD: 3 %
BUN SERPL-MCNC: 5 MG/DL (ref 8–20)
BUN/CREAT SERPL: 11.6 (ref 10–20)
CALCIUM SERPL-MCNC: 8 MG/DL (ref 8.5–10.5)
CHLORIDE SERPL-SCNC: 110 MMOL/L (ref 95–110)
CO2 SERPL-SCNC: 23 MMOL/L (ref 22–32)
CREAT SERPL-MCNC: 0.43 MG/DL (ref 0.5–1.5)
EOSINOPHIL # BLD: 0.4 K/UL (ref 0–0.7)
EOSINOPHIL NFR BLD: 8 %
ERYTHROCYTE [DISTWIDTH] IN BLOOD BY AUTOMATED COUNT: 13.8 % (ref 11–15)
GLUCOSE SERPL-MCNC: 88 MG/DL (ref 70–99)
HCT VFR BLD AUTO: 28.4 % (ref 35–48)
HGB BLD-MCNC: 9.8 G/DL (ref 12–16)
LYMPHOCYTES # BLD: 1.4 K/UL (ref 1–4)
LYMPHOCYTES NFR BLD: 30 %
MCH RBC QN AUTO: 35.3 PG (ref 27–32)
MCHC RBC AUTO-ENTMCNC: 34.4 G/DL (ref 32–37)
MCV RBC AUTO: 102.4 FL (ref 80–100)
MONOCYTES # BLD: 0.7 K/UL (ref 0–1)
MONOCYTES NFR BLD: 15 %
NEUTROPHILS # BLD AUTO: 2 K/UL (ref 1.8–7.7)
NEUTROPHILS NFR BLD: 44 %
OSMOLALITY UR CALC.SUM OF ELEC: 283 MOSM/KG (ref 275–295)
PLATELET # BLD AUTO: 178 K/UL (ref 140–400)
PMV BLD AUTO: 8.7 FL (ref 7.4–10.3)
POTASSIUM SERPL-SCNC: 3.8 MMOL/L (ref 3.3–5.1)
RBC # BLD AUTO: 2.78 M/UL (ref 3.7–5.4)
SODIUM SERPL-SCNC: 138 MMOL/L (ref 136–144)
WBC # BLD AUTO: 4.6 K/UL (ref 4–11)

## 2018-07-08 PROCEDURE — 85025 COMPLETE CBC W/AUTO DIFF WBC: CPT | Performed by: FAMILY MEDICINE

## 2018-07-08 PROCEDURE — 80048 BASIC METABOLIC PNL TOTAL CA: CPT | Performed by: FAMILY MEDICINE

## 2018-07-08 RX ORDER — CIPROFLOXACIN 250 MG/1
250 TABLET, FILM COATED ORAL 2 TIMES DAILY
Qty: 14 TABLET | Refills: 0 | Status: SHIPPED | OUTPATIENT
Start: 2018-07-08 | End: 2018-07-15

## 2018-07-08 RX ORDER — POTASSIUM CHLORIDE 20 MEQ/1
40 TABLET, EXTENDED RELEASE ORAL ONCE
Status: COMPLETED | OUTPATIENT
Start: 2018-07-08 | End: 2018-07-08

## 2018-07-08 NOTE — PROGRESS NOTES
[facility]    Progress Note    [facility] Patient Status:  Inpatient    3/12/1919 MRN [id number]   Location [facility] 5SW/SE Attending Jun Dorado MD   Hosp Day # 4 PCP Re Mckinley MD       Subjective:   Kierra Trotter

## 2018-08-02 ENCOUNTER — HOSPITAL ENCOUNTER (INPATIENT)
Facility: HOSPITAL | Age: 83
LOS: 3 days | Discharge: NURSING, REHAB OR LONG-TERM CARE | DRG: 372 | End: 2018-08-05
Attending: EMERGENCY MEDICINE | Admitting: INTERNAL MEDICINE
Payer: MEDICARE

## 2018-08-02 ENCOUNTER — APPOINTMENT (OUTPATIENT)
Dept: GENERAL RADIOLOGY | Facility: HOSPITAL | Age: 83
DRG: 372 | End: 2018-08-02
Attending: EMERGENCY MEDICINE
Payer: MEDICARE

## 2018-08-02 DIAGNOSIS — A04.72 CLOSTRIDIUM DIFFICILE COLITIS: Primary | ICD-10-CM

## 2018-08-02 DIAGNOSIS — D50.9 IRON DEFICIENCY ANEMIA, UNSPECIFIED IRON DEFICIENCY ANEMIA TYPE: ICD-10-CM

## 2018-08-02 DIAGNOSIS — D72.829 LEUKOCYTOSIS, UNSPECIFIED TYPE: ICD-10-CM

## 2018-08-02 LAB
ANION GAP SERPL CALC-SCNC: 10 MMOL/L (ref 0–18)
BASOPHILS # BLD: 0 K/UL (ref 0–0.2)
BASOPHILS NFR BLD: 0 %
BILIRUB UR QL: NEGATIVE
BUN SERPL-MCNC: 16 MG/DL (ref 8–20)
BUN/CREAT SERPL: 30.8 (ref 10–20)
CALCIUM SERPL-MCNC: 8.8 MG/DL (ref 8.5–10.5)
CHLORIDE SERPL-SCNC: 106 MMOL/L (ref 95–110)
CO2 SERPL-SCNC: 20 MMOL/L (ref 22–32)
COLOR UR: YELLOW
CREAT SERPL-MCNC: 0.52 MG/DL (ref 0.5–1.5)
EOSINOPHIL # BLD: 0 K/UL (ref 0–0.7)
EOSINOPHIL NFR BLD: 0 %
ERYTHROCYTE [DISTWIDTH] IN BLOOD BY AUTOMATED COUNT: 14.3 % (ref 11–15)
GLUCOSE SERPL-MCNC: 119 MG/DL (ref 70–99)
GLUCOSE UR-MCNC: NEGATIVE MG/DL
HCT VFR BLD AUTO: 35.1 % (ref 35–48)
HGB BLD-MCNC: 11.3 G/DL (ref 12–16)
KETONES UR-MCNC: NEGATIVE MG/DL
LACTATE SERPL-SCNC: 1.5 MMOL/L (ref 0.5–2.2)
LYMPHOCYTES # BLD: 1.7 K/UL (ref 1–4)
LYMPHOCYTES NFR BLD: 7 %
MCH RBC QN AUTO: 33.3 PG (ref 27–32)
MCHC RBC AUTO-ENTMCNC: 32.1 G/DL (ref 32–37)
MCV RBC AUTO: 103.7 FL (ref 80–100)
MONOCYTES # BLD: 3.1 K/UL (ref 0–1)
MONOCYTES NFR BLD: 13 %
MRSA DNA SPEC QL NAA+PROBE: POSITIVE
NEUTROPHILS # BLD AUTO: 19.3 K/UL (ref 1.8–7.7)
NEUTROPHILS NFR BLD: 78 %
NEUTS BAND NFR BLD: 2 %
NITRITE UR QL STRIP.AUTO: POSITIVE
OSMOLALITY UR CALC.SUM OF ELEC: 284 MOSM/KG (ref 275–295)
PH UR: 5 [PH] (ref 5–8)
PLATELET # BLD AUTO: 200 K/UL (ref 140–400)
PMV BLD AUTO: 8.3 FL (ref 7.4–10.3)
POTASSIUM SERPL-SCNC: 3.7 MMOL/L (ref 3.3–5.1)
PROT UR-MCNC: NEGATIVE MG/DL
RBC # BLD AUTO: 3.38 M/UL (ref 3.7–5.4)
RBC #/AREA URNS AUTO: 6 /HPF
SODIUM SERPL-SCNC: 136 MMOL/L (ref 136–144)
SP GR UR STRIP: 1.02 (ref 1–1.03)
UROBILINOGEN UR STRIP-ACNC: <2
VIT C UR-MCNC: NEGATIVE MG/DL
WBC # BLD AUTO: 24.1 K/UL (ref 4–11)
WBC #/AREA URNS AUTO: 7 /HPF

## 2018-08-02 PROCEDURE — 71045 X-RAY EXAM CHEST 1 VIEW: CPT | Performed by: EMERGENCY MEDICINE

## 2018-08-02 RX ORDER — CLOPIDOGREL BISULFATE 75 MG/1
75 TABLET ORAL DAILY
Status: DISCONTINUED | OUTPATIENT
Start: 2018-08-02 | End: 2018-08-05

## 2018-08-02 RX ORDER — SACCHAROMYCES BOULARDII 250 MG
250 CAPSULE ORAL 2 TIMES DAILY
Status: DISCONTINUED | OUTPATIENT
Start: 2018-08-02 | End: 2018-08-04

## 2018-08-02 RX ORDER — METOCLOPRAMIDE HYDROCHLORIDE 5 MG/ML
10 INJECTION INTRAMUSCULAR; INTRAVENOUS EVERY 8 HOURS PRN
Status: DISCONTINUED | OUTPATIENT
Start: 2018-08-02 | End: 2018-08-05

## 2018-08-02 RX ORDER — SODIUM CHLORIDE 0.9 % (FLUSH) 0.9 %
3 SYRINGE (ML) INJECTION AS NEEDED
Status: DISCONTINUED | OUTPATIENT
Start: 2018-08-02 | End: 2018-08-05

## 2018-08-02 RX ORDER — BISACODYL 10 MG
10 SUPPOSITORY, RECTAL RECTAL
Status: DISCONTINUED | OUTPATIENT
Start: 2018-08-02 | End: 2018-08-05

## 2018-08-02 RX ORDER — DOCUSATE SODIUM 100 MG/1
100 CAPSULE, LIQUID FILLED ORAL 2 TIMES DAILY
Status: DISCONTINUED | OUTPATIENT
Start: 2018-08-02 | End: 2018-08-05

## 2018-08-02 RX ORDER — ONDANSETRON 2 MG/ML
4 INJECTION INTRAMUSCULAR; INTRAVENOUS EVERY 6 HOURS PRN
Status: DISCONTINUED | OUTPATIENT
Start: 2018-08-02 | End: 2018-08-05

## 2018-08-02 RX ORDER — POLYETHYLENE GLYCOL 3350 17 G/17G
17 POWDER, FOR SOLUTION ORAL DAILY PRN
Status: DISCONTINUED | OUTPATIENT
Start: 2018-08-02 | End: 2018-08-05

## 2018-08-02 RX ORDER — ACETAMINOPHEN 325 MG/1
650 TABLET ORAL EVERY 6 HOURS PRN
Status: DISCONTINUED | OUTPATIENT
Start: 2018-08-02 | End: 2018-08-05

## 2018-08-02 RX ORDER — POTASSIUM CHLORIDE 14.9 MG/ML
20 INJECTION INTRAVENOUS ONCE
Status: COMPLETED | OUTPATIENT
Start: 2018-08-02 | End: 2018-08-03

## 2018-08-02 RX ORDER — GUAIFENESIN/DEXTROMETHORPHAN 100-10MG/5
10 SYRUP ORAL EVERY 6 HOURS PRN
Status: DISCONTINUED | OUTPATIENT
Start: 2018-08-02 | End: 2018-08-05

## 2018-08-02 RX ORDER — GABAPENTIN 100 MG/1
100 CAPSULE ORAL NIGHTLY
Status: DISCONTINUED | OUTPATIENT
Start: 2018-08-02 | End: 2018-08-05

## 2018-08-02 RX ORDER — LIDOCAINE 50 MG/G
2 PATCH TOPICAL EVERY 12 HOURS PRN
Status: DISCONTINUED | OUTPATIENT
Start: 2018-08-03 | End: 2018-08-05

## 2018-08-02 RX ORDER — SODIUM PHOSPHATE, DIBASIC AND SODIUM PHOSPHATE, MONOBASIC 7; 19 G/133ML; G/133ML
1 ENEMA RECTAL ONCE AS NEEDED
Status: DISCONTINUED | OUTPATIENT
Start: 2018-08-02 | End: 2018-08-05

## 2018-08-02 RX ORDER — SODIUM CHLORIDE 9 MG/ML
INJECTION, SOLUTION INTRAVENOUS CONTINUOUS
Status: DISCONTINUED | OUTPATIENT
Start: 2018-08-02 | End: 2018-08-05

## 2018-08-02 RX ORDER — ESCITALOPRAM OXALATE 10 MG/1
5 TABLET ORAL NIGHTLY
Status: DISCONTINUED | OUTPATIENT
Start: 2018-08-02 | End: 2018-08-05

## 2018-08-02 NOTE — ED NOTES
Pt sent by NH for fever. States was recently treated for c.diff, pt states had soft BM today but not diarrhea, denies blood in stool. Pt denies n/v, denies abdominal pain. Pt has no complaints. Caregiver at bedside.

## 2018-08-02 NOTE — ED PROVIDER NOTES
Patient Seen in: [facility] AND [facility] Emergency Department    History   Patient presents with:  Fever (infectious)    Stated Complaint: fever    HPI    Patient presents to the emergency department from nursing facility for fever up to 101 today.   She had be (MULTIVITAMIN OR),  Take  by mouth. take 1 tablet (2.5MG)  by ORAL route  every day ; Take with a full glass of water and remain in an upright position   Glucosamine-Chondroit-Vit C-Mn (GLUCOSAMINE-CHONDROITIN) Oral Cap,  Take by mouth daily.  Glucosamine C Warm, dry, well perfused. Good skin turgor. No rashes seen. Neurology:  Moving all extremities equally with good coordination. No cranial nerve asymmetry noted. Psychiatric:  Normal affect. No unusual behavior. Interacting well.     We will check u -----------         ------                     CBC W/ DIFFERENTIAL[[id number]]          Abnormal            Final result                 Please view results for these tests on the individual orders.    RAINBOW DRAW BLUE   RAINBOW

## 2018-08-03 ENCOUNTER — APPOINTMENT (OUTPATIENT)
Dept: ULTRASOUND IMAGING | Facility: HOSPITAL | Age: 83
DRG: 372 | End: 2018-08-03
Attending: INTERNAL MEDICINE
Payer: MEDICARE

## 2018-08-03 LAB
ADENOVIRUS F 40/41 PCR: NEGATIVE
ALBUMIN SERPL BCP-MCNC: 2.9 G/DL (ref 3.5–4.8)
ALBUMIN/GLOB SERPL: 1.6 {RATIO} (ref 1–2)
ALP SERPL-CCNC: 45 U/L (ref 32–100)
ALT SERPL-CCNC: 12 U/L (ref 14–54)
ANION GAP SERPL CALC-SCNC: 5 MMOL/L (ref 0–18)
AST SERPL-CCNC: 19 U/L (ref 15–41)
ASTROVIRUS PCR: NEGATIVE
BASOPHILS # BLD: 0.1 K/UL (ref 0–0.2)
BASOPHILS NFR BLD: 1 %
BILIRUB SERPL-MCNC: 1.2 MG/DL (ref 0.3–1.2)
BUN SERPL-MCNC: 9 MG/DL (ref 8–20)
BUN/CREAT SERPL: 20.5 (ref 10–20)
C CAYETANENSIS DNA SPEC QL NAA+PROBE: NEGATIVE
C. DIFFICILE TOXIN A/B PCR: POSITIVE
CALCIUM SERPL-MCNC: 8.2 MG/DL (ref 8.5–10.5)
CAMPY SP DNA.DIARRHEA STL QL NAA+PROBE: NEGATIVE
CHLORIDE SERPL-SCNC: 112 MMOL/L (ref 95–110)
CO2 SERPL-SCNC: 22 MMOL/L (ref 22–32)
CREAT SERPL-MCNC: 0.44 MG/DL (ref 0.5–1.5)
CRYPTOSP DNA SPEC QL NAA+PROBE: NEGATIVE
EAEC PAA PLAS AGGR+AATA ST NAA+NON-PRB: NEGATIVE
EC STX1+STX2 + H7 FLIC SPEC NAA+PROBE: NEGATIVE
ENTAMOEBA HISTOLYTICA PCR: NEGATIVE
EOSINOPHIL # BLD: 0.1 K/UL (ref 0–0.7)
EOSINOPHIL NFR BLD: 1 %
EPEC EAE GENE STL QL NAA+NON-PROBE: NEGATIVE
ERYTHROCYTE [DISTWIDTH] IN BLOOD BY AUTOMATED COUNT: 13.7 % (ref 11–15)
ERYTHROCYTE [SEDIMENTATION RATE] IN BLOOD: 12 MM/HR (ref 0–42)
ETEC LTA+ST1A+ST1B TOX ST NAA+NON-PROBE: NEGATIVE
GIARDIA LAMBLIA PCR: NEGATIVE
GLOBULIN PLAS-MCNC: 1.8 G/DL (ref 2.5–3.7)
GLUCOSE SERPL-MCNC: 92 MG/DL (ref 70–99)
HAPTOGLOB SERPL-MCNC: 174 MG/DL (ref 16–200)
HCT VFR BLD AUTO: 28.2 % (ref 35–48)
HCT VFR BLD AUTO: 31.1 % (ref 35–48)
HGB BLD-MCNC: 10.1 G/DL (ref 12–16)
HGB BLD-MCNC: 9.2 G/DL (ref 12–16)
IRON SATN MFR SERPL: 6 % (ref 15–50)
IRON SERPL-MCNC: 19 MCG/DL (ref 28–170)
LYMPHOCYTES # BLD: 1.8 K/UL (ref 1–4)
LYMPHOCYTES NFR BLD: 12 %
MCH RBC QN AUTO: 33.6 PG (ref 27–32)
MCHC RBC AUTO-ENTMCNC: 32.8 G/DL (ref 32–37)
MCV RBC AUTO: 102.3 FL (ref 80–100)
MONOCYTES # BLD: 1.8 K/UL (ref 0–1)
MONOCYTES NFR BLD: 12 %
NEUTROPHILS # BLD AUTO: 11.5 K/UL (ref 1.8–7.7)
NEUTROPHILS NFR BLD: 75 %
NOROVIRUS GI/GII PCR: NEGATIVE
OSMOLALITY UR CALC.SUM OF ELEC: 286 MOSM/KG (ref 275–295)
P SHIGELLOIDES DNA STL QL NAA+PROBE: NEGATIVE
PLATELET # BLD AUTO: 182 K/UL (ref 140–400)
PMV BLD AUTO: 8.2 FL (ref 7.4–10.3)
POTASSIUM SERPL-SCNC: 3.3 MMOL/L (ref 3.3–5.1)
POTASSIUM SERPL-SCNC: 3.3 MMOL/L (ref 3.3–5.1)
PROT SERPL-MCNC: 4.7 G/DL (ref 5.9–8.4)
RBC # BLD AUTO: 2.75 M/UL (ref 3.7–5.4)
RETICS/RBC NFR AUTO: 1.7 % (ref 0.5–1.5)
ROTAVIRUS A PCR: NEGATIVE
SALMONELLA DNA SPEC QL NAA+PROBE: NEGATIVE
SAPOVIRUS PCR: NEGATIVE
SHIGELLA SP+EIEC IPAH ST NAA+NON-PROBE: NEGATIVE
SODIUM SERPL-SCNC: 139 MMOL/L (ref 136–144)
TIBC SERPL-MCNC: 298 MCG/DL (ref 228–428)
TRANSFERRIN SERPL-MCNC: 226 MG/DL (ref 192–382)
V CHOLERAE DNA SPEC QL NAA+PROBE: NEGATIVE
VIBRIO DNA SPEC NAA+PROBE: NEGATIVE
VIT B12 SERPL-MCNC: 235 PG/ML (ref 181–914)
WBC # BLD AUTO: 15.3 K/UL (ref 4–11)
YERSINIA DNA SPEC NAA+PROBE: NEGATIVE

## 2018-08-03 PROCEDURE — 99221 1ST HOSP IP/OBS SF/LOW 40: CPT | Performed by: INTERNAL MEDICINE

## 2018-08-03 PROCEDURE — 76700 US EXAM ABDOM COMPLETE: CPT | Performed by: INTERNAL MEDICINE

## 2018-08-03 RX ORDER — CYANOCOBALAMIN 1000 UG/ML
1000 INJECTION INTRAMUSCULAR; SUBCUTANEOUS DAILY
Status: COMPLETED | OUTPATIENT
Start: 2018-08-03 | End: 2018-08-05

## 2018-08-03 RX ORDER — FOLIC ACID 1 MG/1
1 TABLET ORAL DAILY
Status: DISCONTINUED | OUTPATIENT
Start: 2018-08-03 | End: 2018-08-05

## 2018-08-03 NOTE — CM/SW NOTE
Patient is currently a [address] readmission. Pt active 7/8/2018 Thru 10/5/2018 Under . Pt with 63   Days left.       Juan Carter, MSN,CNL  Clinical Transitions Leader  Phone # [phone]

## 2018-08-03 NOTE — PLAN OF CARE
Problem: Patient Centered Care  Goal: Patient preferences are identified and integrated in the patient's plan of care  Interventions:  - What would you like us to know as we care for you?  I appreciate taking your time with me  - Provide timely, complete, a

## 2018-08-03 NOTE — PLAN OF CARE
Patient GI Panel results indicated positive for Clostridium Diffcile. Dr. Zoey Kilpatrick was notified regarding above results. Patient on Enteric contact isolation and precautions will be maintained. No further orders obtained at present time.

## 2018-08-03 NOTE — DISCHARGE SUMMARY
[facility]    PATIENT'S NAME: Heidi Jaramillo TONYA   ATTENDING PHYSICIAN: Gigi Lopez.  Yuliya Mnia MD   PATIENT ACCOUNT#:   [id number]    LOCATION:  [address] [place] [address] Rd #:   [id number]       YOB: 1919  ADMISSION DATE:       07/04 09:51:18  [facility] [id number]  [facility]/    cc: Anibal Rasheed.  Alejo Bajwa MD

## 2018-08-03 NOTE — CM/SW NOTE
Pt is from [address] and has a 24 hr , Vane Robles. Pt's friend, Tiki Palm is pt's [de-identified] ; . Pt has no family in the area. Pt is dependent w/ most ADL's and needs assistance w/ bathing, toileting, dressing, meals, driving and medications.  Pt owns a

## 2018-08-03 NOTE — DIETARY NOTE
ADULT NUTRITION INITIAL ASSESSMENT    Pt is at moderate nutrition risk. Pt does not meet malnutrition criteria. RECOMMENDATIONS TO MD:  Swallow eval may be indicated. --discussed with RN.      NUTRITION DIAGNOSIS/PROBLEM:  Underweight related to inab HISTORY:   Wt Readings from Last 6 Encounters:  08/02/18 : 39.7 kg (87 lb 9.6 oz)  07/04/18 : 40.3 kg (88 lb 14.4 oz)  09/05/17 : 40.1 kg (88 lb 6.4 oz)  08/28/17 : 39.4 kg (86 lb 12.8 oz)  08/22/17 : 37.6 kg (83 lb)  08/17/17 : 37.6 kg (83 lb)    Patient

## 2018-08-03 NOTE — PROGRESS NOTES
[facility]    Progress Note    Juliette Dixon Patient Status:  Inpatient    3/12/1919 MRN [id number]   Location [facility] 5SW/SE Attending Vera Gomez MD   Hosp Day # 1 PCP Kumar Nolen MD       Subjective:   Vivek Alexander 92   BUN  16   --   9   CREATSERUM  0.52   --   0.44*   GFRAA  >60   --   >60   GFRNAA  >60   --   >60   CA  8.8   --   8.2*   ALB   --    --   2.9*   NA  136   --   139   K   --   3.7  3.3  3.3   CL  106   --   112*   CO2  20*   --   22   ALKPHO   --

## 2018-08-03 NOTE — H&P
[facility]    PATIENT'S NAME: Linsey CASTANEDA   ATTENDING PHYSICIAN: Rachel Marr.  Danielle Bolivar MD   PATIENT ACCOUNT#:   [id number]    LOCATION:  [address] [place] [address] #:   [id number]       YOB: 1919  ADMISSION DATE:       08/02 erythema or exudate. NECK:  Supple, without bruits. LUNGS:  Clear to auscultation. HEART:  Regular rhythm. S1, S2 normal.   BREASTS:  Exam deferred at this time. ABDOMEN:  Flat, positive bowel sounds, soft. Nontender, no organomegaly.    EXTREMITI

## 2018-08-03 NOTE — PLAN OF CARE
Problem: Patient Centered Care  Goal: Patient preferences are identified and integrated in the patient's plan of care  Interventions:  - What would you like us to know as we care for you?  I appreciate taking your time with me  - Provide timely, complete, a Nasogastric tube to low intermittent suction as ordered  - Evaluate effectiveness of ordered antiemetic medications  - Provide nonpharmacologic comfort measures as appropriate  - Advance diet as tolerated, if ordered  - Obtain nutritional consult as needed doctor.

## 2018-08-03 NOTE — SLP NOTE
ADULT SWALLOWING EVALUATION    ASSESSMENT    ASSESSMENT/OVERALL IMPRESSION:  SLP BSSE orders received and acknowledged. A swallow evaluation warranted as pt admitted to [address] [place] [address] on 8/2/18 with a fever and friend reports pt coughs with meals.  Pt reports, \"Somet for Referral: R/O aspiration    Problem List  Principal Problem:    Clostridium difficile colitis  Active Problems:    Leukocytosis, unspecified type      Past Medical History  Past Medical History:   Diagnosis Date   • Arthritis    • Back pain    • Bursit intact  Laryngeal Elevation Strength: Appears impaired  Laryngeal Elevation Coordination: Appears impaired  (Please note: Silent aspiration cannot be evaluated clinically.  Videofluoroscopic Swallow Study is required to rule-out silent aspiration.)    Esoph

## 2018-08-04 LAB
BASOPHILS # BLD: 0.1 K/UL (ref 0–0.2)
BASOPHILS NFR BLD: 1 %
EOSINOPHIL # BLD: 0.3 K/UL (ref 0–0.7)
EOSINOPHIL NFR BLD: 3 %
ERYTHROCYTE [DISTWIDTH] IN BLOOD BY AUTOMATED COUNT: 14.2 % (ref 11–15)
HCT VFR BLD AUTO: 27.6 % (ref 35–48)
HEMOCCULT STL QL: POSITIVE
HGB BLD-MCNC: 9.3 G/DL (ref 12–16)
LYMPHOCYTES # BLD: 1.9 K/UL (ref 1–4)
LYMPHOCYTES NFR BLD: 22 %
MCH RBC QN AUTO: 34 PG (ref 27–32)
MCHC RBC AUTO-ENTMCNC: 33.5 G/DL (ref 32–37)
MCV RBC AUTO: 101.4 FL (ref 80–100)
MONOCYTES # BLD: 1.2 K/UL (ref 0–1)
MONOCYTES NFR BLD: 14 %
NEUTROPHILS # BLD AUTO: 5.3 K/UL (ref 1.8–7.7)
NEUTROPHILS NFR BLD: 60 %
PLATELET # BLD AUTO: 181 K/UL (ref 140–400)
PMV BLD AUTO: 8.8 FL (ref 7.4–10.3)
POTASSIUM SERPL-SCNC: 3.4 MMOL/L (ref 3.3–5.1)
POTASSIUM SERPL-SCNC: 4.3 MMOL/L (ref 3.3–5.1)
RBC # BLD AUTO: 2.72 M/UL (ref 3.7–5.4)
WBC # BLD AUTO: 8.8 K/UL (ref 4–11)

## 2018-08-04 RX ORDER — POTASSIUM CHLORIDE 20 MEQ/1
40 TABLET, EXTENDED RELEASE ORAL EVERY 4 HOURS
Status: COMPLETED | OUTPATIENT
Start: 2018-08-04 | End: 2018-08-04

## 2018-08-04 RX ORDER — SACCHAROMYCES BOULARDII 250 MG
250 CAPSULE ORAL 3 TIMES DAILY
Status: DISCONTINUED | OUTPATIENT
Start: 2018-08-04 | End: 2018-08-05

## 2018-08-04 NOTE — PHYSICAL THERAPY NOTE
PHYSICAL THERAPY EVALUATION - INPATIENT     Room Number: 539/539-A  Evaluation Date: 8/4/2018  Type of Evaluation: Initial   Physician Order: PT Eval and Treat    Presenting Problem: C.  Diff  Reason for Therapy: Mobility Dysfunction and Discharge Planning treatment. \"       Problem List  Principal Problem:    Clostridium difficile colitis  Active Problems:    Leukocytosis, unspecified type      Past Medical History  Past Medical History:   Diagnosis Date   • Arthritis    • Back pain    • Bursitis of elbow (including adjusting bedclothes, sheets and blankets)?: A Little   -   Sitting down on and standing up from a chair with arms (e.g., wheelchair, bedside commode, etc.): A Little   -   Moving from lying on back to sitting on the side of the bed?: A Little Status

## 2018-08-04 NOTE — SLP NOTE
SPEECH DAILY NOTE - INPATIENT    ASSESSMENT & PLAN   ASSESSMENT  Pt seen upright in chair with open faced cup of thin liquids. Pt accepted mechanical soft solid and sips of thin liquids from cup (self fed).  Pt with functional oral skills for mastication an Rec continue goal/education x1.      In Progress         FOLLOW UP  Follow Up Needed: Yes  SLP Follow-up Date: 08/04/18  Number of Visits to Meet Established Goals: 1    Session:  This was client's 1st visit following evaluation, therefore it was her second

## 2018-08-04 NOTE — PROGRESS NOTES
[facility]    Progress Note    Leeanna Lyle Patient Status:  Inpatient    3/12/1919 MRN [id number]   Location [facility] 5SW/SE Attending Fabien Minor MD   Hosp Day # 2 PCP Roman Barrera MD       Subjective:   Maria G Whitfield 08/02/18   1506  08/03/18   0454  08/04/18   0458   GLU  119*   --   92   --    BUN  16   --   9   --    CREATSERUM  0.52   --   0.44*   --    GFRAA  >60   --   >60   --    GFRNAA  >60   --   >60   --    CA  8.8   --   8.2*   --    ALB   --    --   2.9*

## 2018-08-04 NOTE — CONSULTS
IP consult to Oncology Once  Consult performed by: Chon Gutierrez ordered by: Tatiana Siu  Reason for consult: Neutrophilic leukocytosis and macrocytic anemia.           [facility]    Report of Hematology/Oncology Consultation • UTI (urinary tract infection)        Past Surgical History:   Procedure Laterality Date   • Excis lumbar disk,one level         Allergies:  Norco [Hydrocodone-Acetaminophen]; Pcn [Penicillins];  Aspirin      Current Facility-Administered Medications on Fi lidocaine 5 % External Patch Place 2 patches onto the skin every 12 (twelve) hours as needed. Apply to b/l shoulders for pain topically. Do not exceed 2 patches at a time. Disp:  Rfl:    gabapentin 100 MG Oral Cap Take 100 mg by mouth nightly.  Disp:  Rfl: Other Topics Concern    Caffeine Concern Yes    Comment: Chocolate     Social History Narrative   None on file       Review of Systems  Pertinent as per HPI.     Physical Exam:   /71 (BP Location: Right arm)   Pulse 74   Temp 97.7 °F (36.5 °C) (Oral) CO2 22 22 - 32 mmol/L   BUN 9 8 - 20 mg/dL   Creatinine 0.44 (L) 0.50 - 1.50 mg/dL   Calcium, Total 8.2 (L) 8.5 - 10.5 mg/dL   ALT 12 (L) 14 - 54 U/L   AST 19 15 - 41 U/L   Alkaline Phosphatase 45 32 - 100 U/L   Bilirubin, Total 1.2 0.3 - 1.2 mg/dL   Total HCT 31.1 (L) 35.0 - 48.0 %   -IRON AND TIBC   Collection Time: 08/03/18  2:30 PM   Result Value Ref Range   Iron 19 (L) 28 - 170 mcg/dL   Total Iron Binding Capacity 298 228 - 428 mcg/dL   Iron Saturation 6 (L) 15 - 50 %   Transferrin 226 192 - 382 mg/dL [facility]  Associate Medical Director  [address] [place] [address]  [phone]      08/03/18

## 2018-08-04 NOTE — PLAN OF CARE
Problem: Patient Centered Care  Goal: Patient preferences are identified and integrated in the patient's plan of care  Interventions:  - What would you like us to know as we care for you?  I appreciate taking your time with me  - Provide timely, complete, a needed  - Establish a toileting routine/schedule  - Consider collaborating with pharmacy to review patient's medication profile   Outcome: Progressing    Goal: Maintains adequate nutritional intake (undernourished)  INTERVENTIONS:  - Monitor percentage of

## 2018-08-04 NOTE — PLAN OF CARE
Problem: Patient Centered Care  Goal: Patient preferences are identified and integrated in the patient's plan of care  Interventions:  - What would you like us to know as we care for you?  I appreciate taking your time with me  - Provide timely, complete, a IV or PO as ordered and tolerated  - Evaluate effectiveness of GI medications  - Encourage mobilization and activity  - Obtain nutritional consult as needed  - Establish a toileting routine/schedule  - Consider collaborating with pharmacy to review patient

## 2018-08-05 VITALS
BODY MASS INDEX: 17.67 KG/M2 | WEIGHT: 87.63 LBS | DIASTOLIC BLOOD PRESSURE: 64 MMHG | SYSTOLIC BLOOD PRESSURE: 125 MMHG | HEART RATE: 75 BPM | RESPIRATION RATE: 16 BRPM | TEMPERATURE: 97 F | OXYGEN SATURATION: 96 % | HEIGHT: 59 IN

## 2018-08-05 PROBLEM — D72.829 LEUKOCYTOSIS, UNSPECIFIED TYPE: Status: RESOLVED | Noted: 2018-08-02 | Resolved: 2018-08-05

## 2018-08-05 LAB
BASOPHILS # BLD: 0.1 K/UL (ref 0–0.2)
BASOPHILS NFR BLD: 2 %
EOSINOPHIL # BLD: 0.3 K/UL (ref 0–0.7)
EOSINOPHIL NFR BLD: 6 %
ERYTHROCYTE [DISTWIDTH] IN BLOOD BY AUTOMATED COUNT: 14 % (ref 11–15)
HCT VFR BLD AUTO: 29 % (ref 35–48)
HGB BLD-MCNC: 9.5 G/DL (ref 12–16)
LYMPHOCYTES # BLD: 2.1 K/UL (ref 1–4)
LYMPHOCYTES NFR BLD: 39 %
MCH RBC QN AUTO: 33.7 PG (ref 27–32)
MCHC RBC AUTO-ENTMCNC: 32.9 G/DL (ref 32–37)
MCV RBC AUTO: 102.6 FL (ref 80–100)
MONOCYTES # BLD: 0.7 K/UL (ref 0–1)
MONOCYTES NFR BLD: 13 %
NEUTROPHILS # BLD AUTO: 2.2 K/UL (ref 1.8–7.7)
NEUTROPHILS NFR BLD: 40 %
PLATELET # BLD AUTO: 202 K/UL (ref 140–400)
PMV BLD AUTO: 8.4 FL (ref 7.4–10.3)
RBC # BLD AUTO: 2.83 M/UL (ref 3.7–5.4)
WBC # BLD AUTO: 5.4 K/UL (ref 4–11)

## 2018-08-05 RX ORDER — SACCHAROMYCES BOULARDII 250 MG
250 CAPSULE ORAL 3 TIMES DAILY
Qty: 90 CAPSULE | Refills: 5 | Status: SHIPPED | OUTPATIENT
Start: 2018-08-05 | End: 2020-02-04

## 2018-08-05 RX ORDER — FOLIC ACID 1 MG/1
1 TABLET ORAL DAILY
Qty: 30 TABLET | Refills: 5 | Status: SHIPPED | OUTPATIENT
Start: 2018-08-06 | End: 2020-01-22

## 2018-08-05 NOTE — PROGRESS NOTES
[facility]    Progress Note    Jamal Roca Patient Status:  Inpatient    3/12/1919 MRN [id number]   Location [facility] 5SW/SE Attending Susu Rico MD   Hosp Day # 3 PCP Belen Pinzon MD       Subjective:   Naomia Heimlich --    --   2.9*   --    --    NA  136   --   139   --    --    K   --    < >  3.3  3.3  3.4  4.3   CL  106   --   112*   --    --    CO2  20*   --   22   --    --    ALKPHO   --    --   45   --    --    AST   --    --   19   --    --    ALT   --    --   12

## 2018-08-05 NOTE — PLAN OF CARE
Problem: Patient Centered Care  Goal: Patient preferences are identified and integrated in the patient's plan of care  Interventions:  - What would you like us to know as we care for you?  I appreciate taking your time with me  - Provide timely, complete, a mobilization and activity  - Obtain nutritional consult as needed  - Establish a toileting routine/schedule  - Consider collaborating with pharmacy to review patient's medication profile   Outcome: Progressing    Goal: Maintains adequate nutritional intake

## 2018-08-06 LAB — MMA: 0.11 UMOL/L

## 2018-08-13 NOTE — CDS QUERY
Please provide a nutritional diagnosis, if known, related to the information below:  1.  The following clinical indicators are present in the medical record:  » BMI:17.69  » Other (please specify) diarrhea and Pt c/o coughing after swallows--discussed this

## 2018-09-10 NOTE — DISCHARGE SUMMARY
[facility]    PATIENT'S NAME: Heidi Jaramillo TONYA   ATTENDING PHYSICIAN: Gigi Lopez.  Yuliya Mina MD   PATIENT ACCOUNT#:   [id number]    LOCATION:  [address] [place] [address] #:   [id number]       YOB: 1919  ADMISSION DATE:       08/02 recommended. Dictated By Andreas Moody MD  d: 09/10/2018 07:30:12  t: 09/10/2018 09:36:55  Job [id number]  [facility]/    cc: Justice Chance.  Eladia Moody MD

## 2018-09-17 ENCOUNTER — PRIOR ORIGINAL RECORDS (OUTPATIENT)
Dept: OTHER | Age: 83
End: 2018-09-17

## 2018-09-17 ENCOUNTER — MYAURORA ACCOUNT LINK (OUTPATIENT)
Dept: OTHER | Age: 83
End: 2018-09-17

## 2019-02-18 ENCOUNTER — PRIOR ORIGINAL RECORDS (OUTPATIENT)
Dept: OTHER | Age: 84
End: 2019-02-18

## 2019-02-28 VITALS
OXYGEN SATURATION: 98 % | HEIGHT: 58 IN | BODY MASS INDEX: 18.05 KG/M2 | DIASTOLIC BLOOD PRESSURE: 60 MMHG | SYSTOLIC BLOOD PRESSURE: 120 MMHG | WEIGHT: 86 LBS | HEART RATE: 61 BPM

## 2019-02-28 VITALS
DIASTOLIC BLOOD PRESSURE: 62 MMHG | HEIGHT: 58 IN | BODY MASS INDEX: 18.26 KG/M2 | HEART RATE: 64 BPM | SYSTOLIC BLOOD PRESSURE: 114 MMHG | WEIGHT: 87 LBS

## 2019-02-28 VITALS — SYSTOLIC BLOOD PRESSURE: 128 MMHG | HEIGHT: 58 IN | DIASTOLIC BLOOD PRESSURE: 70 MMHG | HEART RATE: 100 BPM

## 2019-02-28 VITALS
SYSTOLIC BLOOD PRESSURE: 110 MMHG | OXYGEN SATURATION: 95 % | HEART RATE: 56 BPM | WEIGHT: 84 LBS | HEIGHT: 58 IN | DIASTOLIC BLOOD PRESSURE: 68 MMHG | BODY MASS INDEX: 17.63 KG/M2 | RESPIRATION RATE: 16 BRPM

## 2019-02-28 VITALS
DIASTOLIC BLOOD PRESSURE: 60 MMHG | SYSTOLIC BLOOD PRESSURE: 120 MMHG | WEIGHT: 83 LBS | OXYGEN SATURATION: 97 % | HEART RATE: 83 BPM | BODY MASS INDEX: 17.42 KG/M2 | HEIGHT: 58 IN

## 2019-03-01 VITALS
RESPIRATION RATE: 16 BRPM | OXYGEN SATURATION: 96 % | WEIGHT: 87 LBS | HEART RATE: 66 BPM | DIASTOLIC BLOOD PRESSURE: 84 MMHG | SYSTOLIC BLOOD PRESSURE: 156 MMHG | BODY MASS INDEX: 18.26 KG/M2 | HEIGHT: 58 IN

## 2019-03-04 ENCOUNTER — PRIOR ORIGINAL RECORDS (OUTPATIENT)
Dept: OTHER | Age: 84
End: 2019-03-04

## 2019-03-19 ENCOUNTER — ANCILLARY PROCEDURE (OUTPATIENT)
Dept: CARDIOLOGY | Age: 84
End: 2019-03-19
Attending: INTERNAL MEDICINE

## 2019-03-19 DIAGNOSIS — I25.10 CORONARY ARTERY DISEASE, ANGINA PRESENCE UNSPECIFIED, UNSPECIFIED VESSEL OR LESION TYPE, UNSPECIFIED WHETHER NATIVE OR TRANSPLANTED HEART: ICD-10-CM

## 2019-03-19 PROCEDURE — 93306 TTE W/DOPPLER COMPLETE: CPT | Performed by: INTERNAL MEDICINE

## 2019-03-27 ENCOUNTER — TELEPHONE (OUTPATIENT)
Dept: CARDIOLOGY | Age: 84
End: 2019-03-27

## 2019-04-08 RX ORDER — FOLIC ACID 1 MG/1
TABLET ORAL
COMMUNITY

## 2019-04-08 RX ORDER — POTASSIUM CHLORIDE 20 MEQ/1
TABLET, EXTENDED RELEASE ORAL
COMMUNITY

## 2019-04-08 RX ORDER — POTASSIUM CHLORIDE 750 MG/1
TABLET, FILM COATED, EXTENDED RELEASE ORAL
COMMUNITY

## 2019-04-08 RX ORDER — CLOPIDOGREL BISULFATE 75 MG/1
TABLET ORAL
COMMUNITY

## 2019-04-08 RX ORDER — ESCITALOPRAM OXALATE 5 MG/1
TABLET ORAL
COMMUNITY

## 2019-04-08 RX ORDER — SENNOSIDES 8.6 MG
CAPSULE ORAL
COMMUNITY

## 2019-04-08 RX ORDER — ATORVASTATIN CALCIUM 20 MG/1
TABLET, FILM COATED ORAL
COMMUNITY

## 2019-04-08 RX ORDER — LIDOCAINE 50 MG/G
PATCH TOPICAL
COMMUNITY
End: 2019-07-22 | Stop reason: ALTCHOICE

## 2019-04-08 RX ORDER — GABAPENTIN 100 MG/1
CAPSULE ORAL
COMMUNITY

## 2019-07-19 PROBLEM — I10 ESSENTIAL HYPERTENSION: Status: ACTIVE | Noted: 2019-07-19

## 2019-07-19 PROBLEM — I25.10 CAD (CORONARY ARTERY DISEASE): Status: ACTIVE | Noted: 2019-07-19

## 2019-07-22 ENCOUNTER — OFFICE VISIT (OUTPATIENT)
Dept: CARDIOLOGY | Age: 84
End: 2019-07-22

## 2019-07-22 VITALS
SYSTOLIC BLOOD PRESSURE: 140 MMHG | BODY MASS INDEX: 18.89 KG/M2 | WEIGHT: 90 LBS | HEART RATE: 90 BPM | DIASTOLIC BLOOD PRESSURE: 62 MMHG | OXYGEN SATURATION: 98 % | HEIGHT: 58 IN

## 2019-07-22 DIAGNOSIS — I10 ESSENTIAL HYPERTENSION: ICD-10-CM

## 2019-07-22 DIAGNOSIS — I25.118 CORONARY ARTERY DISEASE OF NATIVE ARTERY OF NATIVE HEART WITH STABLE ANGINA PECTORIS (CMD): Primary | ICD-10-CM

## 2019-07-22 RX ORDER — SACCHAROMYCES BOULARDII 250 MG
250 CAPSULE ORAL 2 TIMES DAILY
COMMUNITY
Start: 2018-09-17

## 2019-07-30 ENCOUNTER — LAB ENCOUNTER (OUTPATIENT)
Dept: LAB | Facility: HOSPITAL | Age: 84
End: 2019-07-30
Attending: INTERNAL MEDICINE
Payer: MEDICARE

## 2019-07-30 DIAGNOSIS — I25.10 CORONARY ARTERY DISEASE: Primary | ICD-10-CM

## 2019-07-30 LAB
ALT SERPL-CCNC: 21 U/L
ALT SERPL-CCNC: 21 U/L (ref 13–56)
AST SERPL-CCNC: 21 U/L
AST SERPL-CCNC: 21 U/L (ref 15–37)
CHOLEST SERPL-MCNC: 139 MG/DL
CHOLEST SERPL-MCNC: 139 MG/DL
CHOLEST SMN-MCNC: 139 MG/DL (ref ?–200)
CHOLEST/HDLC SERPL: NORMAL {RATIO}
CHOLEST/HDLC SERPL: NORMAL {RATIO}
HDLC SERPL-MCNC: 75 MG/DL
HDLC SERPL-MCNC: 75 MG/DL
HDLC SERPL-MCNC: 75 MG/DL (ref 40–59)
LDLC SERPL CALC-MCNC: 41 MG/DL
LDLC SERPL CALC-MCNC: 41 MG/DL
LDLC SERPL CALC-MCNC: 41 MG/DL (ref ?–100)
LENGTH OF FAST TIME PATIENT: NORMAL H
LENGTH OF FAST TIME PATIENT: YES H
NONHDLC SERPL-MCNC: 64 MG/DL
NONHDLC SERPL-MCNC: 64 MG/DL (ref ?–130)
NONHDLC SERPL-MCNC: NORMAL MG/DL
PATIENT FASTING: YES
TRIGL SERPL-MCNC: 113 MG/DL
TRIGL SERPL-MCNC: 113 MG/DL
TRIGL SERPL-MCNC: 113 MG/DL (ref 30–149)
VLDLC SERPL CALC-MCNC: 23 MG/DL
VLDLC SERPL CALC-MCNC: 23 MG/DL (ref 0–30)
VLDLC SERPL CALC-MCNC: NORMAL MG/DL

## 2019-07-30 PROCEDURE — 36415 COLL VENOUS BLD VENIPUNCTURE: CPT

## 2019-07-30 PROCEDURE — 84450 TRANSFERASE (AST) (SGOT): CPT

## 2019-07-30 PROCEDURE — 80061 LIPID PANEL: CPT

## 2019-07-30 PROCEDURE — 84460 ALANINE AMINO (ALT) (SGPT): CPT

## 2019-07-31 ENCOUNTER — CLINICAL ABSTRACT (OUTPATIENT)
Dept: CARDIOLOGY | Age: 84
End: 2019-07-31

## 2019-07-31 ENCOUNTER — TELEPHONE (OUTPATIENT)
Dept: CARDIOLOGY | Age: 84
End: 2019-07-31

## 2019-08-26 ENCOUNTER — HOSPITAL ENCOUNTER (EMERGENCY)
Facility: HOSPITAL | Age: 84
Discharge: HOME | End: 2019-08-26
Attending: EMERGENCY MEDICINE
Payer: MEDICARE

## 2019-08-26 ENCOUNTER — APPOINTMENT (OUTPATIENT)
Dept: GENERAL RADIOLOGY | Facility: HOSPITAL | Age: 84
End: 2019-08-26
Attending: EMERGENCY MEDICINE
Payer: MEDICARE

## 2019-08-26 VITALS
TEMPERATURE: 98 F | RESPIRATION RATE: 14 BRPM | HEART RATE: 75 BPM | OXYGEN SATURATION: 96 % | BODY MASS INDEX: 20.7 KG/M2 | HEIGHT: 56 IN | DIASTOLIC BLOOD PRESSURE: 81 MMHG | SYSTOLIC BLOOD PRESSURE: 149 MMHG | WEIGHT: 92 LBS

## 2019-08-26 DIAGNOSIS — R07.9 CHEST PAIN OF UNCERTAIN ETIOLOGY: Primary | ICD-10-CM

## 2019-08-26 LAB
ABSOLUTE IMMATURE GRANULOCYTES (OFFPRE24): NORMAL
ALBUMIN SERPL-MCNC: NORMAL G/DL
ALBUMIN/GLOB SERPL: NORMAL {RATIO}
ALP SERPL-CCNC: NORMAL U/L
ALT SERPL-CCNC: NORMAL U/L
ANION GAP SERPL CALC-SCNC: 9 MMOL/L
ANION GAP SERPL CALC-SCNC: 9 MMOL/L (ref 0–18)
AST SERPL-CCNC: NORMAL U/L
BASO+EOS+MONOS # BLD: NORMAL 10*3/UL
BASO+EOS+MONOS NFR BLD: NORMAL %
BASOPHILS # BLD AUTO: 0.08 X10(3) UL (ref 0–0.2)
BASOPHILS # BLD: NORMAL 10*3/UL
BASOPHILS NFR BLD AUTO: 1.4 %
BASOPHILS NFR BLD: NORMAL %
BILIRUB SERPL-MCNC: NORMAL MG/DL
BUN BLD-MCNC: 14 MG/DL (ref 7–18)
BUN SERPL-MCNC: 14 MG/DL
BUN/CREAT SERPL: 27.5 (ref 10–20)
BUN/CREAT SERPL: NORMAL
CALCIUM BLD-MCNC: 9 MG/DL (ref 8.5–10.1)
CALCIUM SERPL-MCNC: 9 MG/DL
CHLORIDE SERPL-SCNC: 107 MMOL/L
CHLORIDE SERPL-SCNC: 107 MMOL/L (ref 98–112)
CO2 SERPL-SCNC: 29 MMOL/L
CO2 SERPL-SCNC: 29 MMOL/L (ref 21–32)
CREAT BLD-MCNC: 0.51 MG/DL (ref 0.55–1.02)
CREAT SERPL-MCNC: 0.51 MG/DL
DEPRECATED RDW RBC AUTO: 54.8 FL (ref 35.1–46.3)
DIFFERENTIAL METHOD BLD: NORMAL
EOSINOPHIL # BLD AUTO: 0.2 X10(3) UL (ref 0–0.7)
EOSINOPHIL # BLD: NORMAL 10*3/UL
EOSINOPHIL NFR BLD AUTO: 3.4 %
EOSINOPHIL NFR BLD: NORMAL %
ERYTHROCYTE [DISTWIDTH] IN BLOOD BY AUTOMATED COUNT: 14.4 % (ref 11–15)
ERYTHROCYTE [DISTWIDTH] IN BLOOD: NORMAL %
GLOBULIN SER-MCNC: NORMAL G/DL
GLUCOSE BLD-MCNC: 94 MG/DL (ref 70–99)
GLUCOSE SERPL-MCNC: 94 MG/DL
HCT VFR BLD AUTO: 39.5 % (ref 35–48)
HCT VFR BLD CALC: 39.5 %
HGB BLD-MCNC: 13.1 G/DL
HGB BLD-MCNC: 13.1 G/DL (ref 12–16)
IMM GRANULOCYTES # BLD AUTO: 0.02 X10(3) UL (ref 0–1)
IMM GRANULOCYTES NFR BLD: 0.3 %
IMMATURE GRANULOCYTES (OFFPRE25): NORMAL
LENGTH OF FAST TIME PATIENT: NORMAL H
LYMPHOCYTES # BLD AUTO: 2.1 X10(3) UL (ref 1–4)
LYMPHOCYTES # BLD: NORMAL 10*3/UL
LYMPHOCYTES NFR BLD AUTO: 36.1 %
LYMPHOCYTES NFR BLD: NORMAL %
MCH RBC QN AUTO: 34.6 PG
MCH RBC QN AUTO: 34.6 PG (ref 26–34)
MCHC RBC AUTO-ENTMCNC: 33.2 G/DL
MCHC RBC AUTO-ENTMCNC: 33.2 G/DL (ref 31–37)
MCV RBC AUTO: 104.2 FL
MCV RBC AUTO: 104.2 FL (ref 80–100)
MONOCYTES # BLD AUTO: 0.74 X10(3) UL (ref 0.1–1)
MONOCYTES # BLD: NORMAL 10*3/UL
MONOCYTES NFR BLD AUTO: 12.7 %
MONOCYTES NFR BLD: NORMAL %
MPV (OFFPRE2): NORMAL
NEUTROPHILS # BLD AUTO: 2.68 X10 (3) UL (ref 1.5–7.7)
NEUTROPHILS # BLD AUTO: 2.68 X10(3) UL (ref 1.5–7.7)
NEUTROPHILS # BLD: NORMAL 10*3/UL
NEUTROPHILS NFR BLD AUTO: 46.1 %
NEUTROPHILS NFR BLD: NORMAL %
NRBC BLD MANUAL-RTO: NORMAL %
OSMOLALITY SERPL CALC.SUM OF ELEC: 300 MOSM/KG (ref 275–295)
PLAT MORPH BLD: NORMAL
PLATELET # BLD AUTO: 235 10(3)UL (ref 150–450)
PLATELET # BLD: 235 10*3/UL
POTASSIUM SERPL-SCNC: 3.6 MMOL/L
POTASSIUM SERPL-SCNC: 3.6 MMOL/L (ref 3.5–5.1)
PROT SERPL-MCNC: NORMAL G/DL
RBC # BLD AUTO: 3.79 X10(6)UL (ref 3.8–5.3)
RBC # BLD: 3.79 10*6/UL
RBC MORPH BLD: NORMAL
SODIUM SERPL-SCNC: 145 MMOL/L
SODIUM SERPL-SCNC: 145 MMOL/L (ref 136–145)
TROPONIN I SERPL-MCNC: <0.045 NG/ML (ref ?–0.04)
TROPONIN I SERPL-MCNC: <0.045 NG/ML (ref ?–0.04)
WBC # BLD AUTO: 5.8 X10(3) UL (ref 4–11)
WBC # BLD: 5.8 10*3/UL
WBC MORPH BLD: NORMAL

## 2019-08-26 PROCEDURE — 84484 ASSAY OF TROPONIN QUANT: CPT | Performed by: EMERGENCY MEDICINE

## 2019-08-26 PROCEDURE — 93005 ELECTROCARDIOGRAM TRACING: CPT

## 2019-08-26 PROCEDURE — 36415 COLL VENOUS BLD VENIPUNCTURE: CPT

## 2019-08-26 PROCEDURE — 85025 COMPLETE CBC W/AUTO DIFF WBC: CPT | Performed by: EMERGENCY MEDICINE

## 2019-08-26 PROCEDURE — 71045 X-RAY EXAM CHEST 1 VIEW: CPT | Performed by: EMERGENCY MEDICINE

## 2019-08-26 PROCEDURE — 80048 BASIC METABOLIC PNL TOTAL CA: CPT | Performed by: EMERGENCY MEDICINE

## 2019-08-26 PROCEDURE — 99284 EMERGENCY DEPT VISIT MOD MDM: CPT

## 2019-08-26 PROCEDURE — 93010 ELECTROCARDIOGRAM REPORT: CPT | Performed by: EMERGENCY MEDICINE

## 2019-08-26 RX ORDER — POTASSIUM CHLORIDE 1.5 G/1.77G
20 POWDER, FOR SOLUTION ORAL 2 TIMES DAILY
COMMUNITY

## 2019-08-26 NOTE — ED INITIAL ASSESSMENT (HPI)
Chest pain that occurred last night then again this am.  Denies sob/dizziness. Lasted only a few min per ems. Now pain free without any complaints.

## 2019-08-26 NOTE — ED NOTES
Discharge instructions given to pt, POA, and caregiver. Pt and caregiver verbalized understanding of home care and to follow up with pcp. Pt and caregiver denied further questions or concerns. Pt discharged via wheelchair, discharged in stable condition.

## 2019-08-26 NOTE — ED PROVIDER NOTES
Patient Seen in: [facility] AND [facility] Emergency Department    History   Patient presents with:  Chest Pain Angina (cardiovascular)    Stated Complaint:     HPI    Patient is a 8-year-old female that is brought here from the [employer].   She Vitals [[birth year]/26/[birth year] 0939]   /87   Pulse 75   Resp 14   Temp 98.2 °F (36.8 °C)   Temp src Oral   SpO2 97 %   O2 Device None (Room air)       Current:/75   Pulse 68   Temp 98.2 °F (36.8 °C) (Oral)   Resp 15   Ht 142.2 cm (4' 8\")   Wt 41.7 kg   SpO2 TROPONIN I - Normal   TROPONIN I - Normal   CBC WITH DIFFERENTIAL WITH PLATELET    Narrative: The following orders were created for panel order CBC WITH DIFFERENTIAL WITH PLATELET.   Procedure                               Abnormality         Status

## 2020-01-02 ENCOUNTER — HOSPITAL ENCOUNTER (OUTPATIENT)
Dept: GENERAL RADIOLOGY | Facility: HOSPITAL | Age: 85
Discharge: HOME | End: 2020-01-02
Attending: INTERNAL MEDICINE
Payer: MEDICARE

## 2020-01-02 ENCOUNTER — TELEPHONE (OUTPATIENT)
Dept: CARDIOLOGY | Age: 85
End: 2020-01-02

## 2020-01-02 DIAGNOSIS — R05.9 COUGH: ICD-10-CM

## 2020-01-02 DIAGNOSIS — I25.118 CORONARY ARTERY DISEASE OF NATIVE ARTERY OF NATIVE HEART WITH STABLE ANGINA PECTORIS (CMD): Primary | ICD-10-CM

## 2020-01-02 PROCEDURE — 71046 X-RAY EXAM CHEST 2 VIEWS: CPT | Performed by: INTERNAL MEDICINE

## 2020-01-06 ENCOUNTER — TELEPHONE (OUTPATIENT)
Dept: CARDIOLOGY | Age: 85
End: 2020-01-06

## 2020-01-06 DIAGNOSIS — I25.118 CORONARY ARTERY DISEASE OF NATIVE ARTERY OF NATIVE HEART WITH STABLE ANGINA PECTORIS (CMD): ICD-10-CM

## 2020-01-24 RX ORDER — ASPIRIN 81 MG/1
81 TABLET ORAL DAILY
Refills: 0 | COMMUNITY
Start: 2019-12-18

## 2020-01-27 ENCOUNTER — OFFICE VISIT (OUTPATIENT)
Dept: CARDIOLOGY | Age: 85
End: 2020-01-27

## 2020-01-27 VITALS
HEIGHT: 58 IN | DIASTOLIC BLOOD PRESSURE: 64 MMHG | SYSTOLIC BLOOD PRESSURE: 116 MMHG | WEIGHT: 93 LBS | BODY MASS INDEX: 19.52 KG/M2 | HEART RATE: 70 BPM

## 2020-01-27 DIAGNOSIS — I25.118 CORONARY ARTERY DISEASE OF NATIVE ARTERY OF NATIVE HEART WITH STABLE ANGINA PECTORIS (CMD): Primary | ICD-10-CM

## 2020-01-27 DIAGNOSIS — I10 ESSENTIAL HYPERTENSION: ICD-10-CM

## 2020-01-27 PROCEDURE — 99214 OFFICE O/P EST MOD 30 MIN: CPT | Performed by: INTERNAL MEDICINE

## 2020-01-27 SDOH — HEALTH STABILITY: MENTAL HEALTH: HOW OFTEN DO YOU HAVE A DRINK CONTAINING ALCOHOL?: NEVER

## 2020-01-27 ASSESSMENT — PATIENT HEALTH QUESTIONNAIRE - PHQ9
SUM OF ALL RESPONSES TO PHQ9 QUESTIONS 1 AND 2: 0
2. FEELING DOWN, DEPRESSED OR HOPELESS: NOT AT ALL
SUM OF ALL RESPONSES TO PHQ9 QUESTIONS 1 AND 2: 0
1. LITTLE INTEREST OR PLEASURE IN DOING THINGS: NOT AT ALL

## 2023-01-22 NOTE — CM/SW NOTE
Met with patient and POA at bedside to explain the BPCI/Medicare program. Patient and POA agreed with phone follow up for 3 months from 0 [facility] after discharge from [address] [place] [address]. Patient was enrolled under .   BPCI/Medicare letter and brochure provide
SW received MD order for discharge planning; home RN and PT. Pt and pt's FRANCISCO/Linette declined [place] [address] services. Pt has a 24 hour  at [address]  Pt's POA indicated she will reach out to [facility] if they choose to have it, pos
SW received order for POLST. Current DNR form in pt's hard chart. SW notified Aubrie Miguel from Registration and sent DNR form to be scanned into EPIC (tube 412). SW met w/ pt and pt's POA/Linette to discuss pt's eventual discharge needs.      [facility] is [facility]
all other ROS negative except as per HPI

## (undated) NOTE — IP AVS SNAPSHOT
Patient Demographics     Address  [address] [place] [address] Phone  [phone] [facility]) E-mail Address  [email or web address]      Emergency Contact(s)     Name Relation Home Work Mobile    Tonia Muir [phone]        Allergies as of 7/26/ Commonly known as:  LEXAPRO  Next dose due: Tomorrow morning      Take 1 tablet (5 mg total) by mouth every morning. Mark Zepeda MD         Glucosamine-Chondroitin Caps  Next dose due: Tomorrow morning      Take  by mouth.  Glucosamine Chondroitin Ma [id number] acetaminophen (TYLENOL) tab 650 mg 07/26/17 0528 Given      [id number] atorvastatin (LIPITOR) tab 40 mg 07/25/17 2201 Given      [id number] escitalopram (LEXAPRO) tablet 5 mg 07/26/17 0917 Given      [id number] lisinopril (PRINIVIL,ZESTRIL) tab 5 m Lab - Abbreviation Name Director Address Valid Date Range    [address] [facility] Donnamaria Adjutant. Jason Antonio M.D. [facility]. [address] [place] [id number] 04/29/14 1547 - Present            Microbiology Results (All)     Procedure Component Value Units Da complained of right shoulder and arm pain and was taken to the emergency department where she was found to have a right arm fracture at the elbow. She was admitted for pain control and orthopedic evaluation.     PAST MEDICAL HISTORY:  An admission in [facility] place in [place]. She is unable to get down there as easily.     REVIEW OF SYSTEM:  Patient denies any lightheadedness, headache, neck pain, syncope, chest pain, except she does have right-sided chest wall discomfort when pressing, palpitations, nausea, vo extremities bilaterally. Deep tendon reflexes are +2 and symmetrical to the upper and lower extremities. Sensory exam is intact to soft touch.     LABORATORY DATA:  On admission, white count is 14.5 with a hemoglobin of 11.5, hematocrit 34.4, platelets 23 Patient will need to be considered for bisphosphonate treatment long-term or some other treatment since she evidently has an intolerance to bisphosphonates.     Dictated By Stevie Feldman MD  d: 07/23/2017 14:47:45  t: 07/23/2017 16:47:51  [facility] [id number] per [employer]:  \"Hearing loss/ear\"   • Heart attack [facility])    • Osteoarthritis    • Pure hypercholesterolemia    • Scarlet fever    • Senile osteoporosis    • UTI (urinary tract infection)[MB.2]        Past Surgical History[MB. 1]  Past Surgical History:  N atorvastatin 40 MG Oral Tab Take 40 mg by mouth nightly. Multiple Vitamins-Minerals (MULTIVITAMIN OR) Take  by mouth. take 1 tablet (2.5MG)  by ORAL route  every day ;  Take with a full glass of water and remain in an upright position   HYDROcodone-acetam Skin tear of right forearm without complication, initial encounter    Recommendations:  - Patient lives independently. She takes care of her own activities of daily living. She is right hand dominant.   Based on the patient's independence and preliminar

## (undated) NOTE — ED AVS SNAPSHOT
Mitchell Balderas   MRN: [id number]    Department:  [facility] Emergency Department   Date of Visit:  10/31/2017           Disclosure     Insurance plans vary and the physician(s) referred by the ER may not be covered by your plan.  Please conta CARE PHYSICIAN AT ONCE OR RETURN IMMEDIATELY TO THE EMERGENCY DEPARTMENT. If you have been prescribed any medication(s), please fill your prescription right away and begin taking the medication(s) as directed.   If you believe that any of the medications

## (undated) NOTE — ED AVS SNAPSHOT
[facility] Emergency Department    [address] [address]    Phone:  [phone]    Fax:  [phone] [facility]   MRN: [id number]    Department:  [facility] Emergency Department   Date of Visit:  5/21/ and Class Registration line at [phone] or find a doctor online by visiting [email or web address].    IF THERE IS ANY CHANGE OR WORSENING OF YOUR CONDITION, CALL YOUR PRIMARY CARE PHYSICIAN AT ONCE OR RETURN IMMEDIATELY TO [address] [place] [address].     If

## (undated) NOTE — ED AVS SNAPSHOT
[facility] Emergency Department    [address]     [place] [address]    Phone:  [phone]    Fax:  [phone] [facility]   MRN: [id number]    Department:  [facility] Emergency Department   Date of Visit:  5/21/ service to you, we would appreciate any positive or negative feedback related to the care you received in our emergency department. Please call our [facility] at [phone]. Your Emergency Department team is here to serve you.   You are our top priori I certified that I have received a copy of the aftercare instructions; that these instructions have been explained to me; all questions pertaining to these instructions have been answered in a satisfactory manner.         [email or web address] information, go to [email or web address] and click on the Sign Up Now link in the [employer] box. Enter your [employer] Activation Code exactly as it appears below along with your Zip Code and Date of Birth to complete the sign-up process.  If you do

## (undated) NOTE — ED AVS SNAPSHOT
Kenisha Garay   MRN: [id number]    Department:  [facility] Emergency Department   Date of Visit:  8/26/2019           Disclosure     Insurance plans vary and the physician(s) referred by the ER may not be covered by your plan.  Please contac within the next three months to obtain basic health screening including reassessment of your blood pressure.     IF THERE IS ANY CHANGE OR WORSENING OF YOUR CONDITION, CALL YOUR PRIMARY CARE PHYSICIAN AT ONCE OR RETURN IMMEDIATELY TO THE EMERGENCY DEPARTMEN